# Patient Record
Sex: FEMALE | Race: BLACK OR AFRICAN AMERICAN | Employment: OTHER | RURAL
[De-identification: names, ages, dates, MRNs, and addresses within clinical notes are randomized per-mention and may not be internally consistent; named-entity substitution may affect disease eponyms.]

---

## 2017-03-17 ENCOUNTER — OFFICE VISIT (OUTPATIENT)
Dept: FAMILY MEDICINE CLINIC | Age: 62
End: 2017-03-17

## 2017-03-17 VITALS
BODY MASS INDEX: 37.39 KG/M2 | OXYGEN SATURATION: 99 % | RESPIRATION RATE: 16 BRPM | HEART RATE: 82 BPM | WEIGHT: 219 LBS | SYSTOLIC BLOOD PRESSURE: 133 MMHG | HEIGHT: 64 IN | TEMPERATURE: 97.6 F | DIASTOLIC BLOOD PRESSURE: 74 MMHG

## 2017-03-17 DIAGNOSIS — E78.00 PURE HYPERCHOLESTEROLEMIA: ICD-10-CM

## 2017-03-17 DIAGNOSIS — E11.9 TYPE 2 DIABETES MELLITUS WITHOUT COMPLICATION, WITHOUT LONG-TERM CURRENT USE OF INSULIN (HCC): Primary | ICD-10-CM

## 2017-03-17 DIAGNOSIS — I10 ESSENTIAL HYPERTENSION WITH GOAL BLOOD PRESSURE LESS THAN 130/85: ICD-10-CM

## 2017-03-17 NOTE — MR AVS SNAPSHOT
Visit Information Date & Time Provider Department Dept. Phone Encounter #  
 3/17/2017  1:40 PM Dino Libman, MD [facility] [id number] Follow-up Instructions Return in about 6 months (around 9/17/2017). Follow-up and Disposition History Your Appointments 5/10/2017  1:00 PM  
ESTABLISHED PATIENT with Dino Libman, MD  
[facility] ([facility]) Appt Note: 6 mo f/u  
 1000 [facility] [address] [facility],[address] [address] [phone]  
  
   
 [address] [facility] [address] Upcoming Health Maintenance Date Due Hepatitis C Screening 1955 EYE EXAM RETINAL OR DILATED Q1 5/29/1966 Pneumococcal 19-64 Highest Risk (1 of 3 - PCV13) 5/29/1975 DTaP/Tdap/Td series (1 - Tdap) 5/29/1977 PAP AKA CERVICAL CYTOLOGY 5/29/1977 BREAST CANCER SCRN MAMMOGRAM 5/29/2006 FOBT Q 1 YEAR AGE 50-75 5/29/2006 ZOSTER VACCINE AGE 60> 5/29/2016 MICROALBUMIN Q1 5/6/2017 LIPID PANEL Q1 5/6/2017 HEMOGLOBIN A1C Q6M 5/10/2017 FOOT EXAM Q1 6/8/2017 Allergies as of 3/17/2017  Review Complete On: 3/17/2017 By: Dino Libman, MD  
  
 Severity Noted Reaction Type Reactions Diovan [Valsartan] Low 05/06/2016   Side Effect Cough Current Immunizations  Reviewed on 11/10/2016 Name Date Influenza Vaccine 11/10/2016 Not reviewed this visit You Were Diagnosed With   
  
 Codes Comments Type 2 diabetes mellitus without complication, without long-term current use of insulin (HCC)    -  Primary ICD-10-CM: E11.9 ICD-9-CM: 250.00 Essential hypertension with goal blood pressure less than 130/85     ICD-10-CM: I10 
ICD-9-CM: 401.9 Pure hypercholesterolemia     ICD-10-CM: E78.00 ICD-9-CM: 272.0 Vitals BP Pulse Temp Resp Height(growth percentile) Weight(growth percentile)  133/74 (BP 1 Location: Right arm, BP Patient Position: Sitting) 82 97.6 °F (36.4 °C) (Oral) 16 5' 4\" (1.626 m) 219 lb (99.3 kg) SpO2 BMI OB Status Smoking Status 99% 37.59 kg/m2 Menopause Never Smoker BMI and BSA Data Body Mass Index Body Surface Area  
 37.59 kg/m 2 2.12 m 2 Preferred Pharmacy Pharmacy Name Phone [id number] [phone] Your Updated Medication List  
  
   
This list is accurate as of: 3/17/17  2:52 PM.  Always use your most recent med list.  
  
  
  
  
 aspirin delayed-release 81 mg tablet Take  by mouth daily. atorvastatin 40 mg tablet Commonly known as:  LIPITOR Take 1 Tab by mouth daily. BYDUREON 2 mg/0.65 mL Pnij Generic drug:  exenatide microspheres Inject subcutaneously 2mg  every 7 days  
  
 canagliflozin-metformin 150-1,000 mg Tbph  
Commonly known as:  INVOKAMET XR Take 1 Tab by mouth daily. docusate sodium 100 mg capsule Commonly known as:  Ethyl Hoit Take 100 mg by mouth two (2) times a day. esomeprazole 40 mg capsule Commonly known as:  Ira Meier Take  by mouth daily. furosemide 80 mg tablet Commonly known as:  LASIX Take 1 Tab by mouth daily as needed. Indications: fluid * levothyroxine 200 mcg tablet Commonly known as:  SYNTHROID Take  by mouth Daily (before breakfast). * levothyroxine 25 mcg tablet Commonly known as:  SYNTHROID Take  by mouth Daily (before breakfast). losartan 100 mg tablet Commonly known as:  COZAAR Take 100 mg by mouth daily. meloxicam 7.5 mg tablet Commonly known as:  MOBIC Take 1 Tab by mouth daily. ondansetron 8 mg disintegrating tablet Commonly known as:  ZOFRAN ODT Take 1 Tab by mouth three (3) times daily as needed for Nausea. * Notice: This list has 2 medication(s) that are the same as other medications prescribed for you.  Read the directions carefully, and ask your doctor or other care provider to review them with you. We Performed the Following COLLECTION VENOUS BLOOD,VENIPUNCTURE [id number] CPT(R)] HEMOGLOBIN A1C WITH EAG [96545 CPT(R)] LIPID PANEL [23131 CPT(R)] METABOLIC PANEL, COMPREHENSIVE [08235 CPT(R)] MICROALBUMIN, UR, RAND W/ MICROALBUMIN/CREA RATIO [id number] CPT(R)] Follow-up Instructions Return in about 6 months (around 9/17/2017). Patient Instructions If you have any questions regarding [employer], you may call [employer] support at [phone]. Introducing [facility] SERVICES! Dear Taina Sharma: Thank you for requesting a [employer] account. Our records indicate that you already have an active [employer] account. You can access your account anytime at [email or web address]. [email or web address] Did you know that you can access your hospital and ER discharge instructions at any time in [employer]? You can also review all of your test results from your hospital stay or ER visit. Additional Information If you have questions, please visit the Frequently Asked Questions section of the [employer] website at [email or web address] Remember, [employer] is NOT to be used for urgent needs. For medical emergencies, dial 911. Now available from your iPhone and Android! Please provide this summary of care documentation to your next provider. Your primary care clinician is listed as Tita Martinez. If you have any questions after today's visit, please call [phone].

## 2017-03-17 NOTE — PATIENT INSTRUCTIONS
If you have any questions regarding [employer], you may call [employer] support at [phone].

## 2017-03-17 NOTE — PROGRESS NOTES
Amanda Jasso is a 62 y.o. female presenting for/with:    Cough; Dizziness; and Nasal Congestion    HPI:  DM2  Doing a lot better since last visit. We consolidated to invokamet 1000/150 qd, but p has been on a cruise for past couple weeks, so wasn't taking it regularly, and missed a dose or tow of bydureon. Sugars running low 100's. Lab Results   Component Value Date/Time    Hemoglobin A1c 8.4 11/10/2016 02:12 PM     Lab Results   Component Value Date/Time    Sodium 143 11/10/2016 02:12 PM    Potassium 3.9 11/10/2016 02:12 PM    Chloride 105 11/10/2016 02:12 PM    CO2 25 11/10/2016 02:12 PM    Glucose 199 11/10/2016 02:12 PM    BUN 16 11/10/2016 02:12 PM    Creatinine 0.94 11/10/2016 02:12 PM    BUN/Creatinine ratio 17 11/10/2016 02:12 PM    GFR est AA 76 11/10/2016 02:12 PM    GFR est non-AA 66 11/10/2016 02:12 PM    Calcium 9.4 11/10/2016 02:12 PM       Lab Results   Component Value Date/Time    Microalb/Creat ratio (ug/mg creat.) 10.6 05/06/2016 03:13 PM    Microalbumin, urine 7.5 05/06/2016 03:13 PM     Last foot check good 6/2016  Last eye check ok with Dr. Jerrell Pappas Fall 2016. Hypertension. Blood pressures have been improving. Management at last visit included continuing current regimen . Current regimen: angiotensin II receptor antagonist and loop diuretic. Symptoms include polyuria with urgency. Patient denies chest pain, palpitations.   Lab review:   Lab Results   Component Value Date/Time    Sodium 143 11/10/2016 02:12 PM    Potassium 3.9 11/10/2016 02:12 PM    Chloride 105 11/10/2016 02:12 PM    CO2 25 11/10/2016 02:12 PM    Glucose 199 11/10/2016 02:12 PM    BUN 16 11/10/2016 02:12 PM    Creatinine 0.94 11/10/2016 02:12 PM    BUN/Creatinine ratio 17 11/10/2016 02:12 PM    GFR est AA 76 11/10/2016 02:12 PM    GFR est non-AA 66 11/10/2016 02:12 PM    Calcium 9.4 11/10/2016 02:12 PM     Hypothyroid  Lab Results   Component Value Date/Time    TSH 1.050 11/10/2016 02:12 PM   In goal on synthroid 225mcg/day. PMH, SH, Medications/Allergies: reviewed, on chart. ROS:  Constitutional: No fever, chills or weight loss  Respiratory: No cough, SOB   CV: No chest pain or Palpitations    Visit Vitals    /74 (BP 1 Location: Right arm, BP Patient Position: Sitting)    Pulse 82    Temp 97.6 °F (36.4 °C) (Oral)    Resp 16    Ht 5' 4\" (1.626 m)    Wt 219 lb (99.3 kg)    SpO2 99%    BMI 37.59 kg/m2     Wt Readings from Last 3 Encounters:   03/17/17 219 lb (99.3 kg)   11/10/16 215 lb (97.5 kg)   08/08/16 207 lb (93.9 kg)     BP Readings from Last 3 Encounters:   03/17/17 133/74   11/10/16 107/60   08/08/16 104/54     Physical Examination: General appearance - alert, well appearing, and in no distress  Mental status - alert, oriented to person, place, and time  Eyes - pupils equal and reactive, extraocular eye movements intact  ENT - bilateral external ears and nose normal. Normal lips  Neck - supple, no significant adenopathy, no thyromegaly or mass  Lymphatics - no palpable lymphadenopathy, no hepatosplenomegaly  Chest - clear to auscultation, no wheezes, rales or rhonchi, symmetric air entry  Heart - normal rate, regular rhythm, normal S1, S2, no murmurs, rubs, clicks or gallops  Extremities - peripheral pulses normal, no pedal edema, no clubbing or cyanosis    A/P:  DM2  Feeling good. Working on lose weight (desired) and sugars coming down nicely. Continue invokamet 1000/150 BID. Con't bydureon qwk. Recheck A1c, BMP. If not getting to goal, plan boost invokamet to 2x 150/1000's/day if gFR good. HTN and Hx Edema  W/C. Edema well controlled. Hypothyroid  In goal on 225mcg every day now. con't.   F/U 6mo/PRN

## 2017-03-18 LAB
ALBUMIN SERPL-MCNC: 3.9 G/DL (ref 3.6–4.8)
ALBUMIN/CREAT UR: 27.3 MG/G CREAT (ref 0–30)
ALBUMIN/GLOB SERPL: 1.6 {RATIO} (ref 1.2–2.2)
ALP SERPL-CCNC: 57 IU/L (ref 39–117)
ALT SERPL-CCNC: 10 IU/L (ref 0–32)
AST SERPL-CCNC: 21 IU/L (ref 0–40)
BILIRUB SERPL-MCNC: <0.2 MG/DL (ref 0–1.2)
BUN SERPL-MCNC: 12 MG/DL (ref 8–27)
BUN/CREAT SERPL: 13 (ref 11–26)
CALCIUM SERPL-MCNC: 8.7 MG/DL (ref 8.7–10.3)
CHLORIDE SERPL-SCNC: 104 MMOL/L (ref 96–106)
CHOLEST SERPL-MCNC: 128 MG/DL (ref 100–199)
CO2 SERPL-SCNC: 26 MMOL/L (ref 18–29)
CREAT SERPL-MCNC: 0.91 MG/DL (ref 0.57–1)
CREAT UR-MCNC: 155.2 MG/DL
EST. AVERAGE GLUCOSE BLD GHB EST-MCNC: 209 MG/DL
GLOBULIN SER CALC-MCNC: 2.5 G/DL (ref 1.5–4.5)
GLUCOSE SERPL-MCNC: 149 MG/DL (ref 65–99)
HBA1C MFR BLD: 8.9 % (ref 4.8–5.6)
HDLC SERPL-MCNC: 47 MG/DL
LDLC SERPL CALC-MCNC: 54 MG/DL (ref 0–99)
MICROALBUMIN UR-MCNC: 42.4 UG/ML
POTASSIUM SERPL-SCNC: 4.3 MMOL/L (ref 3.5–5.2)
PROT SERPL-MCNC: 6.4 G/DL (ref 6–8.5)
SODIUM SERPL-SCNC: 143 MMOL/L (ref 134–144)
TRIGL SERPL-MCNC: 134 MG/DL (ref 0–149)
VLDLC SERPL CALC-MCNC: 27 MG/DL (ref 5–40)

## 2017-05-31 ENCOUNTER — TELEPHONE (OUTPATIENT)
Dept: FAMILY MEDICINE CLINIC | Age: 62
End: 2017-05-31

## 2017-06-02 ENCOUNTER — TELEPHONE (OUTPATIENT)
Dept: FAMILY MEDICINE CLINIC | Age: 62
End: 2017-06-02

## 2017-06-02 NOTE — TELEPHONE ENCOUNTER
Patient just wanted to make sure that her reply to Dr Michelle Christie was received by Dr Michelle Christie through email.

## 2017-07-18 DIAGNOSIS — E11.9 TYPE 2 DIABETES MELLITUS WITHOUT COMPLICATION, WITHOUT LONG-TERM CURRENT USE OF INSULIN (HCC): ICD-10-CM

## 2017-07-18 NOTE — PROGRESS NOTES
Sugar up, too high. Salt, cholesterol, and kidney levels all ok. Recommend boosting invokamet XR to 2 pills daily. New order sent to pharmacy.

## 2017-09-15 ENCOUNTER — TELEPHONE (OUTPATIENT)
Dept: FAMILY MEDICINE CLINIC | Age: 62
End: 2017-09-15

## 2017-09-15 DIAGNOSIS — E03.9 ACQUIRED HYPOTHYROIDISM: Primary | ICD-10-CM

## 2017-09-15 RX ORDER — LEVOTHYROXINE SODIUM 25 UG/1
25 TABLET ORAL
Qty: 90 TAB | Refills: 3 | Status: SHIPPED | OUTPATIENT
Start: 2017-09-15 | End: 2018-05-24 | Stop reason: DRUGHIGH

## 2017-09-15 RX ORDER — LEVOTHYROXINE SODIUM 200 UG/1
200 TABLET ORAL
Qty: 90 TAB | Refills: 3 | Status: SHIPPED | OUTPATIENT
Start: 2017-09-15 | End: 2018-08-10 | Stop reason: SDUPTHER

## 2017-09-15 NOTE — TELEPHONE ENCOUNTER
[phone] contact # radha Samuel, please have Dr. Monroe Nichols contact me. Patient states that she is very upset as she attempted to get refill from pharmacy and got no response from this office of approval or denial patient did not give name or dosage of medication as she hung up/lost connection before giving me any of this information (see attached paste below)     [facility] contacted Kaya FLORENCIO Rosales - message pasted from refill note on 08/10/2017 with no medication name or dosage    Lizzie states she reached out to Dr. Monroe Nichols on [place] and still no response, assured patient that  has been extremely busy and does check messages and will respond to patients request.  Patient would like for Dr. Monroe Nichols to contact her/not the nurse.   Cc: Andrew Desouza   Last visit  Friday, March 17, 2017 01:40 PM  Next visit   Wednesday, September 20, 2017 01:00 PM     Thanks,

## 2017-09-15 NOTE — TELEPHONE ENCOUNTER
Contacted pt. She noted she asked her pharmacy to send the ordering provider a refill request for synthroid, but didn't check to see if it was ordered by us or by her provider in [place], so the refill request may not have come to us. I'll order it, her last TSH was in goal. Also will check on handicap tag, pt hasn't gotten that yet.

## 2017-12-04 ENCOUNTER — TELEPHONE (OUTPATIENT)
Dept: FAMILY MEDICINE CLINIC | Age: 62
End: 2017-12-04

## 2017-12-04 NOTE — TELEPHONE ENCOUNTER
Spoke with sister, states she blew her nose and it was bleeding on one side, she put pressure on it and it has stopped. Advised what to do if starts bleeding again.

## 2018-02-26 ENCOUNTER — OFFICE VISIT (OUTPATIENT)
Dept: FAMILY MEDICINE CLINIC | Age: 63
End: 2018-02-26

## 2018-02-26 VITALS
TEMPERATURE: 97.9 F | RESPIRATION RATE: 14 BRPM | SYSTOLIC BLOOD PRESSURE: 110 MMHG | DIASTOLIC BLOOD PRESSURE: 62 MMHG | OXYGEN SATURATION: 100 % | HEART RATE: 89 BPM | WEIGHT: 217 LBS | BODY MASS INDEX: 37.05 KG/M2 | HEIGHT: 64 IN

## 2018-02-26 DIAGNOSIS — E78.00 PURE HYPERCHOLESTEROLEMIA: ICD-10-CM

## 2018-02-26 DIAGNOSIS — Z12.11 SCREEN FOR COLON CANCER: ICD-10-CM

## 2018-02-26 DIAGNOSIS — Z00.00 MEDICARE ANNUAL WELLNESS VISIT, SUBSEQUENT: Primary | ICD-10-CM

## 2018-02-26 DIAGNOSIS — Z13.39 SCREENING FOR ALCOHOLISM: ICD-10-CM

## 2018-02-26 DIAGNOSIS — Z13.31 SCREENING FOR DEPRESSION: ICD-10-CM

## 2018-02-26 DIAGNOSIS — I10 ESSENTIAL HYPERTENSION WITH GOAL BLOOD PRESSURE LESS THAN 130/85: ICD-10-CM

## 2018-02-26 DIAGNOSIS — Z23 ENCOUNTER FOR IMMUNIZATION: ICD-10-CM

## 2018-02-26 DIAGNOSIS — E03.9 ACQUIRED HYPOTHYROIDISM: ICD-10-CM

## 2018-02-26 DIAGNOSIS — Z11.59 NEED FOR HEPATITIS C SCREENING TEST: ICD-10-CM

## 2018-02-26 DIAGNOSIS — E11.9 TYPE 2 DIABETES MELLITUS WITHOUT COMPLICATION, WITHOUT LONG-TERM CURRENT USE OF INSULIN (HCC): ICD-10-CM

## 2018-02-26 RX ORDER — ONDANSETRON 8 MG/1
8 TABLET, ORALLY DISINTEGRATING ORAL
Qty: 20 TAB | Refills: 1 | Status: SHIPPED | OUTPATIENT
Start: 2018-02-26

## 2018-02-26 RX ORDER — FUROSEMIDE 40 MG/1
40 TABLET ORAL DAILY
Qty: 90 TAB | Refills: 3 | Status: SHIPPED | OUTPATIENT
Start: 2018-02-26 | End: 2019-01-25 | Stop reason: SDUPTHER

## 2018-02-26 RX ORDER — RANITIDINE 150 MG/1
150 TABLET, FILM COATED ORAL 2 TIMES DAILY
COMMUNITY
End: 2020-09-10 | Stop reason: RX

## 2018-02-26 RX ORDER — ATORVASTATIN CALCIUM 40 MG/1
40 TABLET, FILM COATED ORAL DAILY
Qty: 90 TAB | Refills: 3 | Status: SHIPPED | OUTPATIENT
Start: 2018-02-26 | End: 2019-02-14 | Stop reason: SDUPTHER

## 2018-02-26 RX ORDER — POTASSIUM CHLORIDE 750 MG/1
10 TABLET, EXTENDED RELEASE ORAL DAILY
Qty: 90 TAB | Refills: 3 | Status: SHIPPED | OUTPATIENT
Start: 2018-02-26 | End: 2019-01-27 | Stop reason: SDUPTHER

## 2018-02-26 NOTE — PROGRESS NOTES
Markos Verduzco is a 65 y.o. female presenting for/with: Annual Wellness Visit; Advance Care Planning; and Diabetes    HPI:  DM2  Doing well since last visit. We boosted invokamet XR to 2x 150/1000mg pills daily. Taking them regularly now. Still taking bydureon once a week. Sugars running low 100's. Lab Results   Component Value Date/Time    Hemoglobin A1c 8.9 (H) 03/17/2017 02:16 PM    Hemoglobin A1c 8.4 (H) 11/10/2016 02:12 PM    Hemoglobin A1c 8.5 (H) 05/06/2016 03:11 PM    Glucose 149 (H) 03/17/2017 02:16 PM    Microalb/Creat ratio (ug/mg creat.) 27.3 03/17/2017 02:16 PM    LDL, calculated 54 03/17/2017 02:16 PM    Creatinine 0.91 03/17/2017 02:16 PM     Last foot check good 6/2016  Last eye check ok with Dr. Quentin Armenta Fall 2016. Hypertension. Blood pressures have been improving. Management at last visit included continuing current regimen . Current regimen: angiotensin II receptor antagonist and loop diuretic. Symptoms include polyuria with urgency. Patient denies chest pain, palpitations. Lab review:   Lab Results   Component Value Date/Time    Sodium 143 03/17/2017 02:16 PM    Potassium 4.3 03/17/2017 02:16 PM    Chloride 104 03/17/2017 02:16 PM    CO2 26 03/17/2017 02:16 PM    Glucose 149 (H) 03/17/2017 02:16 PM    BUN 12 03/17/2017 02:16 PM    Creatinine 0.91 03/17/2017 02:16 PM    BUN/Creatinine ratio 13 03/17/2017 02:16 PM    GFR est AA 79 03/17/2017 02:16 PM    GFR est non-AA 69 03/17/2017 02:16 PM    Calcium 8.7 03/17/2017 02:16 PM     Hypothyroid  Lab Results   Component Value Date/Time    TSH 1.050 11/10/2016 02:12 PM   In goal on synthroid 225mcg/day. PMH, SH, Medications/Allergies: reviewed, on chart.     ROS:  Constitutional: No fever, chills or weight loss  Respiratory: No cough, SOB   CV: No chest pain or Palpitations    Visit Vitals    /62 (BP 1 Location: Right arm, BP Patient Position: Sitting)    Pulse 89    Temp 97.9 °F (36.6 °C) (Oral)    Resp 14    Ht 5' 4\" (1.626 m)  Wt 217 lb (98.4 kg)    SpO2 100%    BMI 37.25 kg/m2     Wt Readings from Last 3 Encounters:   02/26/18 217 lb (98.4 kg)   03/17/17 219 lb (99.3 kg)   11/10/16 215 lb (97.5 kg)   -2#  BP Readings from Last 3 Encounters:   02/26/18 110/62   03/17/17 133/74   11/10/16 107/60     Physical Examination: General appearance - alert, well appearing, and in no distress  Mental status - alert, oriented to person, place, and time  Eyes - pupils equal and reactive, extraocular eye movements intact  ENT - bilateral external ears and nose normal. Normal lips  Neck - supple, no significant adenopathy, no thyromegaly or mass. Incidentally noted polypoid skin papules   Lymphatics - no palpable lymphadenopathy, no hepatosplenomegaly  Chest - clear to auscultation, no wheezes, rales or rhonchi, symmetric air entry  Heart - normal rate, regular rhythm, normal S1, S2, no murmurs, rubs, clicks or gallops  Extremities - peripheral pulses normal, no pedal edema, no clubbing or cyanosis. Foot check: No calluses, no tinea. DP, PT pulses 2+ bilat. Monofilament test normal bilat. A/P:  DM2  Feeling good. Working on lose weight (desired) and sugars coming down nicely. Continue invokamet 1000/150 1 BID. Con't bydureon qwk. Recheck A1c, CMP. HTN and Hx Edema  W/C. Edema well controlled, ok to cut lasix to 40mg every day. Hypothyroid  In goal on 225mcg every day now. Con't. Check TSH.    ______________________________________________________________________    Pierre Hernandez is a 65 y.o. female and presents for annual Medicare Wellness Visit. Problem List: Reviewed with patient and discussed risk factors.     Patient Active Problem List   Diagnosis Code    History of colonoscopy Z98.890    Colon polyp K63.5    Essential hypertension with goal blood pressure less than 130/85 I10    Type 2 diabetes mellitus without complication (HCC) O43.1    Pure hypercholesterolemia E78.00    Primary osteoarthritis of left shoulder M19.012    Gastroesophageal reflux disease without esophagitis K21.9    Malignant neoplasm of central portion of left female breast ([facility] [address] C50.112    Acquired hypothyroidism E03.9       Current medical providers:  Patient Care Team:  Mariusz Chauhan MD as PCP - General (Family Practice)    PM, , Medications/Allergies: reviewed, on chart. Female Alcohol Screening: On any occasion during the past 3 months, have you had more than 3 drinks containing alcohol? No    Do you average more than 7 drinks per week? No    ROS:  Constitutional: No fever, chills or weight loss  Respiratory: No cough, SOB   CV: No chest pain or Palpitations    Objective:  Visit Vitals    /62 (BP 1 Location: Right arm, BP Patient Position: Sitting)    Pulse 89    Temp 97.9 °F (36.6 °C) (Oral)    Resp 14    Ht 5' 4\" (1.626 m)    Wt 217 lb (98.4 kg)    SpO2 100%    BMI 37.25 kg/m2    Body mass index is 37.25 kg/(m^2). Assessment of cognitive impairment: Alert and oriented x 3    Depression Screen:   PHQ over the last two weeks 2/26/2018   PHQ Not Done -   Little interest or pleasure in doing things Not at all   Feeling down, depressed or hopeless Not at all   Total Score PHQ 2 0       Fall Risk Assessment:  No flowsheet data found. Functional Ability:   Does the patient exhibit a steady gait? yes   How long did it take the patient to get up and walk from a sitting position? 1s   Is the patient self reliant?  (ie can do own laundry, meals, household chores)  yes     Does the patient handle his/her own medications? yes     Does the patient handle his/her own money? yes     Is the patients home safe (ie good lighting, handrails on stairs and bath, etc.)? yes     Did you notice or did patient express any hearing difficulties? no     Did you notice or did patient express any vision difficulties?    no       Advance Care Planning:   Patient was offered the opportunity to discuss advance care planning:  yes     Does patient have an Advance Directive:  no   If no, did you provide information on Caring Connections? yes     Plan:      Orders Placed This Encounter    Depression Screen Annual    Influenza virus vaccine (QUADRIVALENT PF SYRINGE) (88228)    Pneumococcal Conjugate Vaccine    HEMOGLOBIN A1C WITH EAG    TSH RFX ON ABNORMAL TO FREE T4    METABOLIC PANEL, COMPREHENSIVE    LIPID PANEL    OCCULT BLOOD, IMMUNOASSAY (FIT) (54430)    HCV AB W/RFLX TO [facility] DIABETES FOOT EXAM    Influenza Admin ()    Annual  Alcohol Screen 15 min ()    Pneumococcal Admin ()    raNITIdine (ZANTAC) 150 mg tablet    furosemide (LASIX) 40 mg tablet    potassium chloride (KLOR-CON) 10 mEq tablet    ondansetron (ZOFRAN ODT) 8 mg disintegrating tablet    atorvastatin (LIPITOR) 40 mg tablet       Health Maintenance   Topic Date Due    Hepatitis C Screening  1955    Pneumococcal 19-64 Highest Risk (1 of 3 - PCV13) 05/29/1975    PAP AKA CERVICAL CYTOLOGY  05/29/1977    BREAST CANCER SCRN MAMMOGRAM  05/29/2006    FOBT Q 1 YEAR AGE 50-75  05/29/2006    ZOSTER VACCINE AGE 60>  03/29/2016    FOOT EXAM Q1  06/08/2017    Influenza Age 5 to Adult  08/01/2017    HEMOGLOBIN A1C Q6M  09/17/2017    MEDICARE YEARLY EXAM  11/11/2017    MICROALBUMIN Q1  03/17/2018    LIPID PANEL Q1  03/17/2018    EYE EXAM RETINAL OR DILATED Q1  08/17/2018    DTaP/Tdap/Td series (2 - Td) 02/26/2028       *Patient verbalized understanding and agreement with the plan. A copy of the After Visit Summary with personalized health plan was given to the patient today.     F/U 6mo/PRN

## 2018-02-26 NOTE — MR AVS SNAPSHOT
Ashlee Andres 
 
 
 [address] [facility] [phone] Patient: Gilberto Cochran MRN: [id number] MARTHA:[birth year] Visit Information Date & Time Provider Department Dept. Phone Encounter #  
 2/26/2018  1:20 PM Parviz Victoria  Mitra Delatorre [id number] Follow-up Instructions Return in about 3 months (around 5/26/2018). Upcoming Health Maintenance Date Due Hepatitis C Screening 1955 Pneumococcal 19-64 Highest Risk (1 of 3 - PCV13) 5/29/1975 PAP AKA CERVICAL CYTOLOGY 5/29/1977 BREAST CANCER SCRN MAMMOGRAM 5/29/2006 FOBT Q 1 YEAR AGE 50-75 5/29/2006 ZOSTER VACCINE AGE 60> 3/29/2016 FOOT EXAM Q1 6/8/2017 Influenza Age 5 to Adult 8/1/2017 HEMOGLOBIN A1C Q6M 9/17/2017 MEDICARE YEARLY EXAM 11/11/2017 MICROALBUMIN Q1 3/17/2018 LIPID PANEL Q1 3/17/2018 EYE EXAM RETINAL OR DILATED Q1 8/17/2018 DTaP/Tdap/Td series (2 - Td) 2/26/2028 Allergies as of 2/26/2018  Review Complete On: 2/26/2018 By: Parviz Victoria MD  
  
 Severity Noted Reaction Type Reactions Diovan [Valsartan] Low 05/06/2016   Side Effect Cough Current Immunizations  Reviewed on 11/10/2016 Name Date Influenza Vaccine 2/26/2018, 11/10/2016 Influenza Vaccine (Quad) PF 2/26/2018 Pneumococcal Conjugate (PCV-13)  Incomplete Not reviewed this visit You Were Diagnosed With   
  
 Codes Comments Medicare annual wellness visit, subsequent    -  Primary ICD-10-CM: Z00.00 ICD-9-CM: V70.0 Encounter for immunization     ICD-10-CM: A86 ICD-9-CM: V03.89 Type 2 diabetes mellitus without complication, without long-term current use of insulin (HCC)     ICD-10-CM: E11.9 ICD-9-CM: 250.00 Pure hypercholesterolemia     ICD-10-CM: E78.00 ICD-9-CM: 272.0 Essential hypertension with goal blood pressure less than 130/85     ICD-10-CM: I10 
ICD-9-CM: 401.9 Acquired hypothyroidism     ICD-10-CM: E03.9 ICD-9-CM: 244.9 Screening for alcoholism     ICD-10-CM: Z13.89 ICD-9-CM: V79.1 Screening for depression     ICD-10-CM: Z13.89 ICD-9-CM: V79.0 Screen for colon cancer     ICD-10-CM: Z12.11 ICD-9-CM: V76.51 Need for hepatitis C screening test     ICD-10-CM: Z11.59 
ICD-9-CM: V73.89 Vitals BP Pulse Temp Resp Height(growth percentile) Weight(growth percentile) 110/62 (BP 1 Location: Right arm, BP Patient Position: Sitting) 89 97.9 °F (36.6 °C) (Oral) 14 5' 4\" (1.626 m) 217 lb (98.4 kg) SpO2 BMI OB Status Smoking Status 100% 37.25 kg/m2 Menopause Never Smoker BMI and BSA Data Body Mass Index Body Surface Area  
 37.25 kg/m 2 2.11 m 2 Preferred Pharmacy Pharmacy Name Phone [address] [place] [address] [address] [place] [address] Your Updated Medication List  
  
   
This list is accurate as of 2/26/18  2:33 PM.  Always use your most recent med list.  
  
  
  
  
 aspirin delayed-release 81 mg tablet Take  by mouth daily. atorvastatin 40 mg tablet Commonly known as:  LIPITOR Take 1 Tab by mouth daily. Indications: cholesterol and heart BYDUREON 2 mg/0.65 mL Pnij Generic drug:  exenatide microspheres INJECT SUBCUTANEOUSLY 2MG  EVERY 7 DAYS  
  
 canagliflozin-metformin 150-1,000 mg Tbph  
Commonly known as:  INVOKAMET XR Take 2 Tabs by mouth daily. Indications: sugar, new higher dose  
  
 furosemide 40 mg tablet Commonly known as:  LASIX Take 1 Tab by mouth daily. Indications: fluid and heart * levothyroxine 25 mcg tablet Commonly known as:  SYNTHROID Take 1 Tab by mouth Daily (before breakfast). Indications: low thyroid, take with the 200mcg * levothyroxine 200 mcg tablet Commonly known as:  SYNTHROID Take 1 Tab by mouth Daily (before breakfast). Indications: low thyroid, take with the 25mcg  
  
 losartan 100 mg tablet Commonly known as:  COZAAR Take 100 mg by mouth daily. meloxicam 7.5 mg tablet Commonly known as:  MOBIC  
TAKE 1 TABLET BY MOUTH  DAILY  
  
 ondansetron 8 mg disintegrating tablet Commonly known as:  ZOFRAN ODT Take 1 Tab by mouth three (3) times daily as needed for Nausea. potassium chloride 10 mEq tablet Commonly known as:  KLOR-CON Take 1 Tab by mouth daily. Indications: mineral  
  
 ZANTAC 150 mg tablet Generic drug:  raNITIdine Take 150 mg by mouth two (2) times a day. * Notice: This list has 2 medication(s) that are the same as other medications prescribed for you. Read the directions carefully, and ask your doctor or other care provider to review them with you. Prescriptions Sent to Pharmacy Refills  
 furosemide (LASIX) 40 mg tablet 3 Sig: Take 1 Tab by mouth daily. Indications: fluid and heart Class: Normal  
 Pharmacy: [address] [place] [address], . [address] Ph #: [phone] Route: Oral  
 potassium chloride (KLOR-CON) 10 mEq tablet 3 Sig: Take 1 Tab by mouth daily. Indications: mineral  
 Class: Normal  
 Pharmacy: [address] [place] [address], . [address] Ph #: [phone] Route: Oral  
 ondansetron (ZOFRAN ODT) 8 mg disintegrating tablet 1 Sig: Take 1 Tab by mouth three (3) times daily as needed for Nausea. Class: Normal  
 Pharmacy: [address] [place] [address], . [address] Ph #: [phone] Route: Oral  
 atorvastatin (LIPITOR) 40 mg tablet 3 Sig: Take 1 Tab by mouth daily. Indications: cholesterol and heart Class: Normal  
 Pharmacy: [address] [place] [address], . [address] Ph #: [phone] Route: Oral  
  
We Performed the Following ADMIN INFLUENZA VIRUS VAC [ [facility]] ADMIN PNEUMOCOCCAL VACCINE [ [facility]] [facility] 68 [[id number] [facility]] HCV AB W/RFLX TO KAILEE [51313 CPT(R)] HEMOGLOBIN A1C WITH EAG [29056 CPT(R)]  DIABETES FOOT EXAM [HM7 Custom] INFLUENZA VIRUS VAC QUAD,SPLIT,PRESV FREE SYRINGE IM [id number] CPT(R)] LIPID PANEL [05498 CPT(R)] METABOLIC PANEL, COMPREHENSIVE [52310 CPT(R)] PNEUMOCOCCAL CONJ VACCINE 13 VALENT IM [id number] CPT(R)] WV ANNUAL ALCOHOL SCREEN 15 MIN [id number] [facility]] TSH RFX ON ABNORMAL TO FREE T4 [[id number] Custom] Follow-up Instructions Return in about 3 months (around 5/26/2018). To-Do List   
 03/28/2018 Lab:  OCCULT BLOOD, IMMUNOASSAY (FIT) Patient Instructions Medicare Wellness Visit, Female The best way to live healthy is to have a healthy lifestyle by eating a well-balanced diet, exercising regularly, limiting alcohol and stopping smoking. Regular physical exams and screening tests are another way to keep healthy. Preventive exams provided by your health care provider can find health problems before they become diseases or illnesses. Preventive services including immunizations, screening tests, monitoring and exams can help you take care of your own health. All people over age 72 should have a pneumovax  and and a prevnar shot to prevent pneumonia. These are once in a lifetime unless you and your provider decide differently. All people over 65 should have a yearly flu shot and a tetanus vaccine every 10 years. A bone mass density to screen for osteoporosis or thinning of the bones should be done every 2 years after 65. Screening for diabetes mellitus with a blood sugar test should be done every year. Glaucoma is a disease of the eye due to increased ocular pressure that can lead to blindness and it should be done every year by an eye professional. 
 
Cardiovascular screening tests that check for elevated lipids (fatty part of blood) which can lead to heart disease and strokes should be done every 5 years.  
 
Colorectal screening that evaluates for blood or polyps in your colon should be done yearly as a stool test or every five years as a flexible sigmoidoscope or every 10 years as a colonoscopy up to age 76. Breast cancer screening with a mammogram is recommended biennially  for women age 54-69. Screening for cervical cancer with a pap smear and pelvic exam is recommended for women after age 72 years every 2 years up to age 79 or when the provider and patient decide to stop. If there is a history of cervical abnormalities or other increased risk for cancer then the test is recommended yearly. Hepatitis C screening is also recommended for anyone born between 80 through [address]. A shingles vaccine is also recommended once in a lifetime after age 61. Your Medicare Wellness Exam is recommended annually. Here is a list of your current Health Maintenance items with a due date: 
Health Maintenance Due Topic Date Due  
 Hepatitis C Test  1955  Pneumococcal Vaccine (1 of 3 - PCV13) 05/29/1975  Cervical Cancer Screening  05/29/1977  Breast Cancer Screening  05/29/2006  Stool testing for trace blood  05/29/2006  Shingles Vaccine  03/29/2016  Diabetic Foot Care  06/08/2017  Flu Vaccine  08/01/2017  Hemoglobin A1C    09/17/2017 Himanshu Griffin Annual Well Visit  11/11/2017  Albumin Urine Test  03/17/2018  Cholesterol Test   03/17/2018 Learning About Bariatric Surgery What is bariatric surgery? Bariatric surgery is surgery to help you lose weight. This type of surgery is only used for people who are very overweight and have not been able to lose weight with diet and exercise. This surgery makes the stomach smaller. Some types of surgery also change the connection between your stomach and intestines. How is bariatric surgery done? Bariatric surgery may be either \"open\" or \"laparoscopic. \" Open surgery is done through a large cut (incision) in the belly. Laparoscopic surgery is done through several small cuts.  The doctor puts a lighted tube, or scope, and other surgical tools through small cuts in your belly. The doctor is able to see your organs with the scope. There are different types of bariatric surgery. Gastric sleeve surgery The surgery is usually done through several small incisions in the belly. The doctor removes more than half of your stomach. This leaves a thin sleeve, or tube, that is about the size of a banana. Because part of your stomach has been removed, this can't be reversed. Robert-en-Y gastric bypass surgery Robert-en-Y (say \"melinda-en-why\") surgery changes the connection between the stomach and the intestines. The doctor separates a section of your stomach from the rest of your stomach. This makes a small pouch. The new pouch will hold the food you eat. The doctor connects the stomach pouch to the middle part of the small intestine. Gastric banding surgery The surgery is usually done through several small incisions in the belly. The doctor wraps a band around the upper part of the stomach. This creates a small pouch. The small size of the pouch means that you will get full after you eat just a small amount of food. The doctor can inflate or deflate the band to adjust the size. This lets the doctor adjust how quickly food passes from the new pouch into the stomach. It does not change the connection between the stomach and the intestines. What can you expect after the surgery? You may stay in the hospital for one or more days after the surgery. How long you stay depends on the type of surgery you had. Most people need 2 to 4 weeks before they are ready to get back to their usual routine. For the first 2 to 6 weeks after surgery, you probably will need to follow a liquid or soft diet. Bit by bit, you will be able to eat more solid foods. Your doctor may advise you to work with a dietitian.  This way you'll be sure to get enough protein, vitamins, and minerals while you are losing weight. Even with a healthy diet, you may need to take vitamin and mineral supplements. After surgery, you will not be able to eat very much at one time. You will get full quickly. Try not to eat too much at one time or eat foods that are high in fat or sugar. If you do, you may vomit, get stomach pain, or have diarrhea. You probably will lose weight very quickly in the first few months after surgery. As time goes on, your weight loss will slow down. You will have regular doctor visits to check how you are doing. Think of bariatric surgery as a tool to help you lose weight. It isn't an instant fix. You will still need to eat a healthy diet and get regular exercise. This will help you reach your weight goal and avoid regaining the weight you lose. Follow-up care is a key part of your treatment and safety. Be sure to make and go to all appointments, and call your doctor if you are having problems. It's also a good idea to know your test results and keep a list of the medicines you take. Where can you learn more? Go to http://joe-melva.info/. Enter G469 in the search box to learn more about \"Learning About Bariatric Surgery. \" Current as of: October 13, 2016 Content Version: 11.4 © 7381-2159 Healthwise, Incorporated. Care instructions adapted under license by [employer] (which disclaims liability or warranty for this information). If you have questions about a medical condition or this instruction, always ask your healthcare professional. [place] [address] any warranty or liability for your use of this information. Introducing [facility]! Dear Lionel Graham: Thank you for requesting a BandPage account. Our records indicate that you already have an active BandPage account. You can access your account anytime at [email or web address] Did you know that you can access your hospital and ER discharge instructions at any time in SuperSport? You can also review all of your test results from your hospital stay or ER visit. Additional Information If you have questions, please visit the Frequently Asked Questions section of the SuperSport website at [email or web address] Remember, SuperSport is NOT to be used for urgent needs. For medical emergencies, dial 911. Now available from your iPhone and Android! Please provide this summary of care documentation to your next provider. Your primary care clinician is listed as Angélica Martinez. If you have any questions after today's visit, please call [phone].

## 2018-02-26 NOTE — PATIENT INSTRUCTIONS
Medicare Wellness Visit, Female    The best way to live healthy is to have a healthy lifestyle by eating a well-balanced diet, exercising regularly, limiting alcohol and stopping smoking. Regular physical exams and screening tests are another way to keep healthy. Preventive exams provided by your health care provider can find health problems before they become diseases or illnesses. Preventive services including immunizations, screening tests, monitoring and exams can help you take care of your own health. All people over age 72 should have a pneumovax  and and a prevnar shot to prevent pneumonia. These are once in a lifetime unless you and your provider decide differently. All people over 65 should have a yearly flu shot and a tetanus vaccine every 10 years. A bone mass density to screen for osteoporosis or thinning of the bones should be done every 2 years after 65. Screening for diabetes mellitus with a blood sugar test should be done every year. Glaucoma is a disease of the eye due to increased ocular pressure that can lead to blindness and it should be done every year by an eye professional.    Cardiovascular screening tests that check for elevated lipids (fatty part of blood) which can lead to heart disease and strokes should be done every 5 years. Colorectal screening that evaluates for blood or polyps in your colon should be done yearly as a stool test or every five years as a flexible sigmoidoscope or every 10 years as a colonoscopy up to age 76. Breast cancer screening with a mammogram is recommended biennially  for women age 54-69. Screening for cervical cancer with a pap smear and pelvic exam is recommended for women after age 72 years every 2 years up to age 79 or when the provider and patient decide to stop. If there is a history of cervical abnormalities or other increased risk for cancer then the test is recommended yearly.     Hepatitis C screening is also recommended for anyone born between [birth year] [facility]. A shingles vaccine is also recommended once in a lifetime after age 61. Your Medicare Wellness Exam is recommended annually. Here is a list of your current Health Maintenance items with a due date:  Health Maintenance Due   Topic Date Due    Hepatitis C Test  1955    Pneumococcal Vaccine (1 of 3 - PCV13) 05/29/1975    Cervical Cancer Screening  05/29/1977    Breast Cancer Screening  05/29/2006    Stool testing for trace blood  05/29/2006    Shingles Vaccine  03/29/2016    Diabetic Foot Care  06/08/2017    Flu Vaccine  08/01/2017    Hemoglobin A1C    09/17/2017    Annual Well Visit  11/11/2017    Albumin Urine Test  03/17/2018    Cholesterol Test   03/17/2018     Learning About Bariatric Surgery  What is bariatric surgery? Bariatric surgery is surgery to help you lose weight. This type of surgery is only used for people who are very overweight and have not been able to lose weight with diet and exercise. This surgery makes the stomach smaller. Some types of surgery also change the connection between your stomach and intestines. How is bariatric surgery done? Bariatric surgery may be either \"open\" or \"laparoscopic. \" Open surgery is done through a large cut (incision) in the belly. Laparoscopic surgery is done through several small cuts. The doctor puts a lighted tube, or scope, and other surgical tools through small cuts in your belly. The doctor is able to see your organs with the scope. There are different types of bariatric surgery. Gastric sleeve surgery  The surgery is usually done through several small incisions in the belly. The doctor removes more than half of your stomach. This leaves a thin sleeve, or tube, that is about the size of a banana. Because part of your stomach has been removed, this can't be reversed.   Robert-en-Y gastric bypass surgery  Robert-en-Y (say \"melinda-en-why\") surgery changes the connection between the stomach and the intestines. The doctor separates a section of your stomach from the rest of your stomach. This makes a small pouch. The new pouch will hold the food you eat. The doctor connects the stomach pouch to the middle part of the small intestine. Gastric banding surgery  The surgery is usually done through several small incisions in the belly. The doctor wraps a band around the upper part of the stomach. This creates a small pouch. The small size of the pouch means that you will get full after you eat just a small amount of food. The doctor can inflate or deflate the band to adjust the size. This lets the doctor adjust how quickly food passes from the new pouch into the stomach. It does not change the connection between the stomach and the intestines. What can you expect after the surgery? You may stay in the hospital for one or more days after the surgery. How long you stay depends on the type of surgery you had. Most people need 2 to 4 weeks before they are ready to get back to their usual routine. For the first 2 to 6 weeks after surgery, you probably will need to follow a liquid or soft diet. Bit by bit, you will be able to eat more solid foods. Your doctor may advise you to work with a dietitian. This way you'll be sure to get enough protein, vitamins, and minerals while you are losing weight. Even with a healthy diet, you may need to take vitamin and mineral supplements. After surgery, you will not be able to eat very much at one time. You will get full quickly. Try not to eat too much at one time or eat foods that are high in fat or sugar. If you do, you may vomit, get stomach pain, or have diarrhea. You probably will lose weight very quickly in the first few months after surgery. As time goes on, your weight loss will slow down. You will have regular doctor visits to check how you are doing. Think of bariatric surgery as a tool to help you lose weight. It isn't an instant fix.  You will still need to eat a healthy diet and get regular exercise. This will help you reach your weight goal and avoid regaining the weight you lose. Follow-up care is a key part of your treatment and safety. Be sure to make and go to all appointments, and call your doctor if you are having problems. It's also a good idea to know your test results and keep a list of the medicines you take. Where can you learn more? Go to http://joe-melva.info/. Enter G469 in the search box to learn more about \"Learning About Bariatric Surgery. \"  Current as of: October 13, 2016  Content Version: 11.4  © 2415-6237 Healthwise, ABS. Care instructions adapted under license by [employer] (which disclaims liability or warranty for this information). If you have questions about a medical condition or this instruction, always ask your healthcare professional. [address] any warranty or liability for your use of this information.

## 2018-02-26 NOTE — ACP (ADVANCE CARE PLANNING)
Pt has advance directive at home. Recommended to bring by the clinic for inclusion in chart at patient's convenience.  Discussed 2/26/2018

## 2018-02-27 LAB
ALBUMIN SERPL-MCNC: 4.6 G/DL (ref 3.6–4.8)
ALBUMIN/GLOB SERPL: 1.5 {RATIO} (ref 1.2–2.2)
ALP SERPL-CCNC: 76 IU/L (ref 39–117)
ALT SERPL-CCNC: 12 IU/L (ref 0–32)
AST SERPL-CCNC: 9 IU/L (ref 0–40)
BILIRUB SERPL-MCNC: 0.3 MG/DL (ref 0–1.2)
BUN SERPL-MCNC: 18 MG/DL (ref 8–27)
BUN/CREAT SERPL: 19 (ref 12–28)
CALCIUM SERPL-MCNC: 9.7 MG/DL (ref 8.7–10.3)
CHLORIDE SERPL-SCNC: 95 MMOL/L (ref 96–106)
CHOLEST SERPL-MCNC: 168 MG/DL (ref 100–199)
CO2 SERPL-SCNC: 25 MMOL/L (ref 18–29)
CREAT SERPL-MCNC: 0.95 MG/DL (ref 0.57–1)
EST. AVERAGE GLUCOSE BLD GHB EST-MCNC: 237 MG/DL
GFR SERPLBLD CREATININE-BSD FMLA CKD-EPI: 65 ML/MIN/1.73
GFR SERPLBLD CREATININE-BSD FMLA CKD-EPI: 75 ML/MIN/1.73
GLOBULIN SER CALC-MCNC: 3.1 G/DL (ref 1.5–4.5)
GLUCOSE SERPL-MCNC: 185 MG/DL (ref 65–99)
HBA1C MFR BLD: 9.9 % (ref 4.8–5.6)
HCV AB S/CO SERPL IA: <0.1 S/CO RATIO (ref 0–0.9)
HCV AB SERPL QL IA: NORMAL
HDLC SERPL-MCNC: 58 MG/DL
LDLC SERPL CALC-MCNC: 78 MG/DL (ref 0–99)
POTASSIUM SERPL-SCNC: 4 MMOL/L (ref 3.5–5.2)
PROT SERPL-MCNC: 7.7 G/DL (ref 6–8.5)
SODIUM SERPL-SCNC: 140 MMOL/L (ref 134–144)
T4 FREE SERPL-MCNC: 1.46 NG/DL (ref 0.82–1.77)
TRIGL SERPL-MCNC: 159 MG/DL (ref 0–149)
TSH SERPL DL<=0.005 MIU/L-ACNC: 5.18 UIU/ML (ref 0.45–4.5)
VLDLC SERPL CALC-MCNC: 32 MG/DL (ref 5–40)

## 2018-02-27 NOTE — PROGRESS NOTES
Sugar not improving, a little worse, but should improve with con't weight loss. Thyroid pill too weak again too. Cholesterol good. Neg for hep C.

## 2018-03-02 LAB — HEMOCCULT STL QL IA: NEGATIVE

## 2018-03-27 DIAGNOSIS — M19.012 PRIMARY OSTEOARTHRITIS OF LEFT SHOULDER: ICD-10-CM

## 2018-03-27 RX ORDER — MELOXICAM 7.5 MG/1
TABLET ORAL
Qty: 90 TAB | Refills: 2 | Status: SHIPPED | OUTPATIENT
Start: 2018-03-27 | End: 2018-12-02 | Stop reason: SDUPTHER

## 2018-05-22 ENCOUNTER — OFFICE VISIT (OUTPATIENT)
Dept: FAMILY MEDICINE CLINIC | Age: 63
End: 2018-05-22

## 2018-05-22 VITALS
HEART RATE: 93 BPM | RESPIRATION RATE: 16 BRPM | WEIGHT: 209.6 LBS | HEIGHT: 64 IN | BODY MASS INDEX: 35.78 KG/M2 | OXYGEN SATURATION: 100 % | SYSTOLIC BLOOD PRESSURE: 110 MMHG | TEMPERATURE: 97.4 F | DIASTOLIC BLOOD PRESSURE: 78 MMHG

## 2018-05-22 DIAGNOSIS — I10 ESSENTIAL HYPERTENSION WITH GOAL BLOOD PRESSURE LESS THAN 130/85: ICD-10-CM

## 2018-05-22 DIAGNOSIS — Z79.4 TYPE 2 DIABETES MELLITUS WITH INSULIN THERAPY (HCC): Primary | ICD-10-CM

## 2018-05-22 DIAGNOSIS — E03.9 ACQUIRED HYPOTHYROIDISM: ICD-10-CM

## 2018-05-22 DIAGNOSIS — E11.9 TYPE 2 DIABETES MELLITUS WITH INSULIN THERAPY (HCC): Primary | ICD-10-CM

## 2018-05-22 DIAGNOSIS — Z23 ENCOUNTER FOR IMMUNIZATION: ICD-10-CM

## 2018-05-22 DIAGNOSIS — E78.00 PURE HYPERCHOLESTEROLEMIA: ICD-10-CM

## 2018-05-22 PROBLEM — E66.01 SEVERE OBESITY (BMI 35.0-39.9) WITH COMORBIDITY (HCC): Status: ACTIVE | Noted: 2018-05-22

## 2018-05-22 RX ORDER — LOSARTAN POTASSIUM 100 MG/1
100 TABLET ORAL DAILY
Qty: 90 TAB | Refills: 3 | Status: SHIPPED | OUTPATIENT
Start: 2018-05-22 | End: 2019-04-05 | Stop reason: SDUPTHER

## 2018-05-22 RX ORDER — ASPIRIN 81 MG/1
81 TABLET ORAL DAILY
Qty: 90 TAB | Refills: 3
Start: 2018-05-22

## 2018-05-22 NOTE — MR AVS SNAPSHOT
[facility] 
 
 
 [address] [facility] [phone] Patient: Jaclyn Dyer MRN: [id number]:[id number] Visit Information Date & Time Provider Department Dept. Phone Encounter #  
 5/22/2018  1:00 PM Marquis Alvarado MD [address] [place] [address] [id number] Follow-up Instructions Return in about 3 months (around 8/22/2018). Upcoming Health Maintenance Date Due ZOSTER VACCINE AGE 60> 3/29/2016 MICROALBUMIN Q1 3/17/2018 Pneumococcal 19-64 Highest Risk (2 of 3 - PPSV23) 5/22/2018* Influenza Age 5 to Adult 8/1/2018 EYE EXAM RETINAL OR DILATED Q1 8/17/2018 HEMOGLOBIN A1C Q6M 8/26/2018 FOOT EXAM Q1 2/26/2019 LIPID PANEL Q1 2/26/2019 MEDICARE YEARLY EXAM 2/27/2019 FOBT Q 1 YEAR AGE 50-75 2/28/2019 BREAST CANCER SCRN MAMMOGRAM 2/27/2020 PAP AKA CERVICAL CYTOLOGY 3/2/2021 DTaP/Tdap/Td series (2 - Td) 2/26/2028 *Topic was postponed. The date shown is not the original due date. Allergies as of 5/22/2018  Review Complete On: 5/22/2018 By: Marquis Alvarado MD  
  
 Severity Noted Reaction Type Reactions Diovan [Valsartan] Low 05/06/2016   Side Effect Cough Current Immunizations  Reviewed on 2/26/2018 Name Date Influenza Vaccine 2/26/2018, 11/10/2016 Influenza Vaccine (Quad) PF 2/26/2018 Pneumococcal Conjugate (PCV-13) 2/26/2018 Not reviewed this visit You Were Diagnosed With   
  
 Codes Comments Type 2 diabetes mellitus with insulin therapy ([facility] [address]    -  Primary ICD-10-CM: E11.9, Z79.4 ICD-9-CM: 250.00, V58.67 Essential hypertension with goal blood pressure less than 130/85     ICD-10-CM: I10 
ICD-9-CM: 401.9 Acquired hypothyroidism     ICD-10-CM: E03.9 ICD-9-CM: 244.9 Pure hypercholesterolemia     ICD-10-CM: E78.00 ICD-9-CM: 272.0 Encounter for immunization     ICD-10-CM: G40 ICD-9-CM: V03.89 Vitals BP Pulse Temp Resp Height(growth percentile) 110/78 (BP 1 Location: Left arm, BP Patient Position: Sitting) 93 97.4 °F (36.3 °C) (Temporal) 16 5' 4\" (1.626 m) Weight(growth percentile) SpO2 BMI OB Status Smoking Status 209 lb 9.6 oz (95.1 kg) 100% 35.98 kg/m2 Menopause Never Smoker BMI and BSA Data Body Mass Index Body Surface Area 35.98 kg/m 2 2.07 m 2 Preferred Pharmacy Pharmacy Name Phone [address] [facility], [address] [place] [address] [address] Ashleigh Douglass Your Updated Medication List  
  
   
This list is accurate as of 5/22/18  2:01 PM.  Always use your most recent med list.  
  
  
  
  
 aspirin delayed-release 81 mg tablet Take 1 Tab by mouth daily. atorvastatin 40 mg tablet Commonly known as:  LIPITOR Take 1 Tab by mouth daily. Indications: cholesterol and heart BYDUREON 2 mg/0.65 mL Pnij Generic drug:  exenatide microspheres INJECT SUBCUTANEOUSLY 2MG  EVERY 7 DAYS  
  
 canagliflozin-metformin 150-1,000 mg Tbph  
Commonly known as:  INVOKAMET XR Take 2 Tabs by mouth daily. Indications: sugar, new higher dose  
  
 furosemide 40 mg tablet Commonly known as:  LASIX Take 1 Tab by mouth daily. Indications: fluid and heart * levothyroxine 25 mcg tablet Commonly known as:  SYNTHROID Take 1 Tab by mouth Daily (before breakfast). Indications: low thyroid, take with the 200mcg * levothyroxine 200 mcg tablet Commonly known as:  SYNTHROID Take 1 Tab by mouth Daily (before breakfast). Indications: low thyroid, take with the 25mcg  
  
 losartan 100 mg tablet Commonly known as:  COZAAR Take 1 Tab by mouth daily. Indications: pressure, kidney  
  
 meloxicam 7.5 mg tablet Commonly known as:  MOBIC  
TAKE 1 TABLET BY MOUTH  DAILY MYRBETRIQ 50 mg ER tablet Generic drug:  mirabegron ER Take 50 mg by mouth daily. ondansetron 8 mg disintegrating tablet Commonly known as:  ZOFRAN ODT  
 Take 1 Tab by mouth three (3) times daily as needed for Nausea. potassium chloride 10 mEq tablet Commonly known as:  KLOR-CON Take 1 Tab by mouth daily. Indications: mineral  
  
 varicella-zoster recombinant (PF) 50 mcg/0.5 mL Susr injection Commonly known as:  SHINGRIX  
0.5 mL by IntraMUSCular route every 6 months for 2 doses. Indications: prevent shingles ZANTAC 150 mg tablet Generic drug:  raNITIdine Take 150 mg by mouth two (2) times a day. * Notice: This list has 2 medication(s) that are the same as other medications prescribed for you. Read the directions carefully, and ask your doctor or other care provider to review them with you. Prescriptions Printed Refills  
 varicella-zoster recombinant, PF, (SHINGRIX) 50 mcg/0.5 mL susr injection 1 Si.5 mL by IntraMUSCular route every 6 months for 2 doses. Indications: prevent shingles Class: Print Route: IntraMUSCular Prescriptions Sent to Pharmacy Refills  
 losartan (COZAAR) 100 mg tablet 3 Sig: Take 1 Tab by mouth daily. Indications: pressure, kidney Class: Normal  
 Pharmacy: [address] [place] [address] [address] Ph #: [phone] Route: Oral  
  
We Performed the Following AMB POC URINE, MICROALBUMIN, SEMIQUANT (3 RESULTS) [59024 CPT(R)] HEMOGLOBIN A1C WITH EAG [79550 CPT(R)] TSH RFX ON ABNORMAL TO FREE T4 [[id number] Custom] Follow-up Instructions Return in about 3 months (around 2018). Patient Instructions Learning About Low-Carbohydrate Diets for Weight Loss What is a low-carbohydrate diet? Low-carb diets avoid foods that are high in carbohydrate. These high-carb foods include pasta, bread, rice, cereal, fruits, and starchy vegetables. Instead, these diets usually have you eat foods that are high in fat and protein. Many people lose weight quickly on a low-carb diet.  But the early weight loss is water. People on this diet often gain the weight back after they start eating carbs again. Not all diet plans are safe or work well. A lot of the evidence shows that low-carb diets aren't healthy. That's because these diets often don't include healthy foods like fruits and vegetables. Losing weight safely means balancing protein, fat, and carbs with every meal and snack. And low-carb diets don't always provide the vitamins, minerals, and fiber you need. If you have a serious medical condition, talk to your doctor before you try any diet. These conditions include kidney disease, heart disease, type 2 diabetes, high cholesterol, and high blood pressure. If you are pregnant, it may not be safe for your baby if you are on a low-carb diet. How can you lose weight safely? You might have heard that a diet plan helped another person lose weight. But that doesn't mean that it will work for you. It is very hard to stay on a diet that includes lots of big changes in your eating habits. If you want to get to a healthy weight and stay there, making healthy lifestyle changes will often work better than dieting. These steps can help. · Make a plan for change. Work with your doctor to create a plan that is right for you. · See a dietitian. He or she can show you how to make healthy changes in your eating habits. · Manage stress. If you have a lot of stress in your life, it can be hard to focus on making healthy changes to your daily habits. · Track your food and activity. You are likely to do better at losing weight if you keep track of what you eat and what you do. Follow-up care is a key part of your treatment and safety. Be sure to make and go to all appointments, and call your doctor if you are having problems. It's also a good idea to know your test results and keep a list of the medicines you take. Where can you learn more? Go to http://joe-melva.info/. Enter A121 in the search box to learn more about \"Learning About Low-Carbohydrate Diets for Weight Loss. \" Current as of: May 12, 2017 Content Version: 11.4 © 5522-2967 Healthwise, [employer]. Care instructions adapted under license by [employer] (which disclaims liability or warranty for this information). If you have questions about a medical condition or this instruction, always ask your healthcare professional. [address] any warranty or liability for your use of this information. Introducing [facility]! Dear April Rust: Thank you for requesting a [employer] account. Our records indicate that you already have an active [employer] account. You can access your account anytime at [email or web address] Did you know that you can access your hospital and ER discharge instructions at any time in [employer]? You can also review all of your test results from your hospital stay or ER visit. Additional Information If you have questions, please visit the Frequently Asked Questions section of the [employer] website at [email or web address] Remember, [employer] is NOT to be used for urgent needs. For medical emergencies, dial 911. Now available from your iPhone and Android! Please provide this summary of care documentation to your next provider. Your primary care clinician is listed as Liza Martinez. If you have any questions after today's visit, please call [phone].

## 2018-05-22 NOTE — PROGRESS NOTES
1. Have you been to the ER, urgent care clinic since your last visit? Hospitalized since your last visit? No    2. Have you seen or consulted any other health care providers outside of the [address] [place] [address] since your last visit? Include any pap smears or colon screening.  Yes When: Dr. Song Greenwood 2 months ago

## 2018-05-22 NOTE — PROGRESS NOTES
Altagracia Damon is a 65 y.o. female presenting for/with:    Diabetes    HPI:  DM2  Doing well since last visit. We boosted invokamet XR to 2x 150/1000mg pills daily. Taking them regularly now. Still taking bydureon once a week. Sugars running low 100's. Down 8#. Working hard on diet. Lab Results   Component Value Date/Time    Hemoglobin A1c 9.9 (H) 02/26/2018 02:27 PM    Hemoglobin A1c 8.9 (H) 03/17/2017 02:16 PM    Hemoglobin A1c 8.4 (H) 11/10/2016 02:12 PM    Glucose 185 (H) 02/26/2018 02:27 PM    Microalb/Creat ratio (ug/mg creat.) 27.3 03/17/2017 02:16 PM    LDL, calculated 78 02/26/2018 02:27 PM    Creatinine 0.95 02/26/2018 02:27 PM     Last foot check good 2/2018  Last eye check ok with Dr. Tino Brown Fall 2016. Hypertension. Blood pressures have been improving. Management at last visit included continuing current regimen . Current regimen: angiotensin II receptor antagonist and loop diuretic. Symptoms include no sx. Patient denies chest pain, palpitations. Lab review:   Lab Results   Component Value Date/Time    Sodium 140 02/26/2018 02:27 PM    Potassium 4.0 02/26/2018 02:27 PM    Chloride 95 (L) 02/26/2018 02:27 PM    CO2 25 02/26/2018 02:27 PM    Glucose 185 (H) 02/26/2018 02:27 PM    BUN 18 02/26/2018 02:27 PM    Creatinine 0.95 02/26/2018 02:27 PM    BUN/Creatinine ratio 19 02/26/2018 02:27 PM    GFR est AA 75 02/26/2018 02:27 PM    GFR est non-AA 65 02/26/2018 02:27 PM    Calcium 9.7 02/26/2018 02:27 PM     Hypothyroid  Lab Results   Component Value Date/Time    TSH 5.180 (H) 02/26/2018 02:27 PM   A little undertreated on synthroid 225mcg/day, but hadn't been taking it regularly over winter. Due for recheck. PMH, SH, Medications/Allergies: reviewed, on chart.     ROS:  Constitutional: No fever, chills or weight loss  Respiratory: No cough, SOB   CV: No chest pain or Palpitations    Visit Vitals    /78 (BP 1 Location: Left arm, BP Patient Position: Sitting)    Pulse 93    Temp 97.4 °F (36.3 °C) (Temporal)    Resp 16    Ht 5' 4\" (1.626 m)    Wt 209 lb 9.6 oz (95.1 kg)    SpO2 100%    BMI 35.98 kg/m2     Wt Readings from Last 3 Encounters:   05/22/18 209 lb 9.6 oz (95.1 kg)   02/26/18 217 lb (98.4 kg)   03/17/17 219 lb (99.3 kg)   -8#  BP Readings from Last 3 Encounters:   05/22/18 110/78   02/26/18 110/62   03/17/17 133/74     Physical Examination: General appearance - alert, well appearing, and in no distress  Mental status - alert, oriented to person, place, and time  Eyes - pupils equal and reactive, extraocular eye movements intact  ENT - bilateral external ears and nose normal. Normal lips  Neck - supple, no significant adenopathy, no thyromegaly or mass. Incidentally noted polypoid skin papules   Lymphatics - no palpable lymphadenopathy, no hepatosplenomegaly  Chest - clear to auscultation, no wheezes, rales or rhonchi, symmetric air entry  Heart - normal rate, regular rhythm, normal S1, S2, no murmurs, rubs, clicks or gallops  Extremities - peripheral pulses normal, no pedal edema, no clubbing or cyanosis. A/P:  DM2  Feeling good. Working on losing weight (desired) and sugars coming down nicely. Continue invokamet 1000/150 1 BID. Con't bydureon qwk. Recheck A1c. Still thinking about bariatrics, but wants to try buckling down first. Making good progress lately. HTN and Hx Edema  W/C. Edema minimal. Ok to cut lasix to 20mg daily, consider change to oretic 25mg daily. Hypothyroid  Will see if back to goal on 225mcg daily. Recheck, Adjust PRN, next dose up would be 250mcg daily.     F/U 3mo/PRN

## 2018-05-22 NOTE — PATIENT INSTRUCTIONS
Learning About Low-Carbohydrate Diets for Weight Loss  What is a low-carbohydrate diet? Low-carb diets avoid foods that are high in carbohydrate. These high-carb foods include pasta, bread, rice, cereal, fruits, and starchy vegetables. Instead, these diets usually have you eat foods that are high in fat and protein. Many people lose weight quickly on a low-carb diet. But the early weight loss is water. People on this diet often gain the weight back after they start eating carbs again. Not all diet plans are safe or work well. A lot of the evidence shows that low-carb diets aren't healthy. That's because these diets often don't include healthy foods like fruits and vegetables. Losing weight safely means balancing protein, fat, and carbs with every meal and snack. And low-carb diets don't always provide the vitamins, minerals, and fiber you need. If you have a serious medical condition, talk to your doctor before you try any diet. These conditions include kidney disease, heart disease, type 2 diabetes, high cholesterol, and high blood pressure. If you are pregnant, it may not be safe for your baby if you are on a low-carb diet. How can you lose weight safely? You might have heard that a diet plan helped another person lose weight. But that doesn't mean that it will work for you. It is very hard to stay on a diet that includes lots of big changes in your eating habits. If you want to get to a healthy weight and stay there, making healthy lifestyle changes will often work better than dieting. These steps can help. · Make a plan for change. Work with your doctor to create a plan that is right for you. · See a dietitian. He or she can show you how to make healthy changes in your eating habits. · Manage stress. If you have a lot of stress in your life, it can be hard to focus on making healthy changes to your daily habits. · Track your food and activity.  You are likely to do better at losing weight if you keep track of what you eat and what you do. Follow-up care is a key part of your treatment and safety. Be sure to make and go to all appointments, and call your doctor if you are having problems. It's also a good idea to know your test results and keep a list of the medicines you take. Where can you learn more? Go to http://joe-melva.info/. Enter A121 in the search box to learn more about \"Learning About Low-Carbohydrate Diets for Weight Loss. \"  Current as of: May 12, 2017  Content Version: 11.4  © 9818-2360 Healthwise, [employer]. Care instructions adapted under license by [employer] (which disclaims liability or warranty for this information). If you have questions about a medical condition or this instruction, always ask your healthcare professional. [address] any warranty or liability for your use of this information.

## 2018-05-24 LAB
EST. AVERAGE GLUCOSE BLD GHB EST-MCNC: 217 MG/DL
HBA1C MFR BLD: 9.2 % (ref 4.8–5.6)
T4 FREE SERPL-MCNC: 2.48 NG/DL (ref 0.82–1.77)
TSH SERPL DL<=0.005 MIU/L-ACNC: 0.02 UIU/ML (ref 0.45–4.5)

## 2018-05-24 NOTE — PROGRESS NOTES
Thyroid pill too strong now, cut to just 200mcg daily. Sugar a little better. Probably can avoid insulin if keeps losing weight about 4# per month.

## 2018-08-10 DIAGNOSIS — E03.9 ACQUIRED HYPOTHYROIDISM: ICD-10-CM

## 2018-08-13 DIAGNOSIS — E11.9 TYPE 2 DIABETES MELLITUS WITHOUT COMPLICATION (HCC): ICD-10-CM

## 2018-08-13 RX ORDER — LEVOTHYROXINE SODIUM 25 UG/1
TABLET ORAL
Qty: 90 TAB | OUTPATIENT
Start: 2018-08-13

## 2018-08-13 RX ORDER — LEVOTHYROXINE SODIUM 200 UG/1
TABLET ORAL
Qty: 90 TAB | Refills: 0 | Status: SHIPPED | OUTPATIENT
Start: 2018-08-13 | End: 2018-11-01 | Stop reason: SDUPTHER

## 2018-08-14 RX ORDER — EXENATIDE 2 MG/.65ML
INJECTION, SUSPENSION, EXTENDED RELEASE SUBCUTANEOUS
Qty: 12 PEN | Refills: 0 | Status: SHIPPED | OUTPATIENT
Start: 2018-08-14 | End: 2018-10-27 | Stop reason: SDUPTHER

## 2019-04-05 DIAGNOSIS — I10 ESSENTIAL HYPERTENSION WITH GOAL BLOOD PRESSURE LESS THAN 130/85: ICD-10-CM

## 2019-04-08 RX ORDER — LOSARTAN POTASSIUM 100 MG/1
TABLET ORAL
Qty: 90 TAB | Refills: 3 | Status: SHIPPED | OUTPATIENT
Start: 2019-04-08 | End: 2020-02-10

## 2019-04-11 ENCOUNTER — OFFICE VISIT (OUTPATIENT)
Dept: SURGERY | Age: 64
End: 2019-04-11

## 2019-04-11 VITALS
HEART RATE: 80 BPM | HEIGHT: 64 IN | SYSTOLIC BLOOD PRESSURE: 148 MMHG | DIASTOLIC BLOOD PRESSURE: 93 MMHG | WEIGHT: 225.6 LBS | OXYGEN SATURATION: 97 % | TEMPERATURE: 97.3 F | BODY MASS INDEX: 38.51 KG/M2

## 2019-04-11 DIAGNOSIS — M79.89 SOFT TISSUE MASS: Primary | ICD-10-CM

## 2019-04-11 DIAGNOSIS — E66.09 CLASS 2 OBESITY DUE TO EXCESS CALORIES WITH BODY MASS INDEX (BMI) OF 38.0 TO 38.9 IN ADULT, UNSPECIFIED WHETHER SERIOUS COMORBIDITY PRESENT: ICD-10-CM

## 2019-04-11 NOTE — Clinical Note
4/19/19 Patient: Abran Steele YOB: 1955 Date of Visit: 4/11/2019 Nori Casiano MD 
[address] [facility] [address] VIA In Basket Dear Nori Casiano MD, Thank you [facility]. My notes for this consultation are attached. If you have questions, please do not hesitate to call me. I look forward to following your patient along with you. Sincerely, Andrew Walker MD

## 2019-04-11 NOTE — PROGRESS NOTES
HISTORY OF PRESENT ILLNESS Alvin Vernon is a 59 y.o. female. Mass anterior hip/thigh No pain 
 
 
____________________________________________________________________________ Patient presents with: 
Skin Problem: SEEN AT THE REQUEST OF DR. CHOUDHURY, FOR POSSIBLE LIPOMA ON LT. THIGH 
 
BP (!) 148/93 (BP 1 Location: Left arm, BP Patient Position: Sitting)   Pulse 80   Temp 97.3 °F (36.3 °C)   Ht 5' 4\" (1.626 m)   Wt 102.3 kg (225 lb 9.6 oz)   SpO2 97%   BMI 38.72 kg/m² Past Medical History: 
No date: Burning with urination Comment:  OVER ACTIVE BLADDER 
5/6/2016: Colon polyp Comment:  2013, due for recheck 2018  
5/6/2016: Essential hypertension with goal blood pressure less than  
130/85 
5/6/2016: Gastroesophageal reflux disease without esophagitis Comment:  No frazier's on EGD 2008 No date: GERD (gastroesophageal reflux disease) 5/6/2016: Malignant neoplasm of central portion of left female breast  
[facility] Comment:  S/p lumpectomy 2004 at [facility], nodes clean. DCIS  
             per pt. Had breast reduction at [facility] 2012. no  
             chemo needed. 5/6/2016: Pure hypercholesterolemia No date: Seizures ([facility] No date: Thyroid disease 5/6/2016: Type 2 diabetes mellitus without complication ([facility] Past Surgical History: 
2010: CARDIAC SURG PROCEDURE UNLIST Comment:  ABLATION 
2004: HX BREAST LUMPECTOMY; Left Comment:  \"below stage 1 cancer\" ? DCIS 
2010: HX BREAST REDUCTION; Bilateral 
    Comment:  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 Social Needs Financial resource strain: Not hard at all Food insecurity: 
      Worry: Never true Inability: Never true Transportation needs: 
      Medical: No 
      Non-medical: No 
  Tobacco Use Smoking status: Never Smoker Smokeless tobacco: Never Used Substance and Sexual Activity Alcohol use: No 
      Alcohol/week: 0.0 oz Frequency: Never Drinks per session: Patient refused Binge frequency: Never Drug use: No 
    Sexual activity: Never Lifestyle Physical activity: 
      Days per week: 0 days Minutes per session: 0 min Stress: Only a little Relationships Social connections: 
      Talks on phone: More than three times a week Gets together: More than three times a week Attends Sikh service: More than 4 times per year Active member of club or organization: Yes Attends meetings of clubs or organizations: More than 4 times per year Relationship status:  Other Topics Concerns: 
       Service: No 
      Blood Transfusions: No 
      Caffeine Concern: No 
      Occupational Exposure: No 
      Hobby Hazards: No 
      Sleep Concern: No 
      Stress Concern: No 
      Weight Concern: Yes Special Diet: Yes 
        diabetic Back Care: Yes Exercise: No 
      Bike Helmet: No 
      Seat Belt: Yes Self-Exams: Yes Review of patient's family history indicates: 
Problem: Colon Cancer Relation: Paternal Aunt Age of Onset: (Not Specified) Problem: Hypertension Relation: Paternal Aunt Age of Onset: (Not Specified) Problem: Diabetes Relation: Paternal Aunt Age of Onset: (Not Specified) Problem: Cancer Relation: Paternal Aunt Age of Onset: (Not Specified) Problem: No Known Problems Relation: Mother Age of Onset: (Not Specified) Problem: Diabetes Relation: Father Age of Onset: (Not Specified) Problem: Cancer Relation: Maternal Grandfather Age of Onset: (Not Specified) Problem: Heart Disease Relation: Paternal Grandmother Age of Onset: (Not Specified) Problem: Stroke Relation: Paternal Grandmother Age of Onset: (Not Specified) Problem: Diabetes Relation: Paternal Grandmother Age of Onset: (Not Specified) Current Outpatient Medications: 
losartan (COZAAR) 100 mg tablet, TAKE 1 TABLET BY MOUTH  DAILY. atorvastatin (LIPITOR) 40 mg tablet, Take 1 Tab by mouth daily. potassium chloride SR (KLOR-CON 10) 10 mEq tablet, TAKE 1 TABLET BY MOUTH  DAILY 
furosemide (LASIX) 40 mg tablet, TAKE 1 TABLET BY MOUTH  DAILY exenatide microspheres (BYDUREON BCISE) 2 mg/0.85 mL atIn, 1 injection weekly for diabetes 
meloxicam (MOBIC) 7.5 mg tablet, TAKE 1 TABLET BY MOUTH  DAILY 
levothyroxine (SYNTHROID) 175 mcg tablet, Take 1 Tab by mouth Daily (before breakfast). canagliflozin-metformin (INVOKAMET XR) 150-1,000 mg TBph, Take 2 Tabs by mouth daily. mirabegron ER (MYRBETRIQ) 50 mg ER tablet, Take 50 mg by mouth daily. aspirin delayed-release 81 mg tablet, Take 1 Tab by mouth daily. raNITIdine (ZANTAC) 150 mg tablet, Take 150 mg by mouth two (2) times a day. ondansetron (ZOFRAN ODT) 8 mg disintegrating tablet, Take 1 Tab by mouth three (3) times daily as needed for Nausea. No current facility-administered medications for this visit. Allergies:  -- Diovan (Valsartan) -- Cough 
_____________________________________________________________________________ Skin Problem The history is provided by the patient. This is a new problem. The current episode started more than 1 week ago. The problem occurs constantly. The problem has not changed since onset. Pertinent negatives include no chest pain, no abdominal pain, no headaches and no shortness of breath. Nothing aggravates the symptoms. Nothing relieves the symptoms. The treatment provided no relief. Review of Systems Constitutional: Negative for chills, fever and weight loss. HENT: Negative for ear pain. Eyes: Negative for pain. Respiratory: Negative for shortness of breath. Cardiovascular: Negative for chest pain. Gastrointestinal: Negative for abdominal pain and blood in stool. Genitourinary: Negative for hematuria. Musculoskeletal: Negative for joint pain. Skin: Negative for rash. Neurological: Negative for dizziness, focal weakness, seizures and headaches. Endo/Heme/Allergies: Does not bruise/bleed easily. Psychiatric/Behavioral: The patient does not have insomnia. Physical Exam  
Constitutional: She is oriented to person, place, and time. She appears well-developed and well-nourished. No distress. HENT:  
Head: Normocephalic and atraumatic. Mouth/Throat: No oropharyngeal exudate. Eyes: Pupils are equal, round, and reactive to light. Neck: Normal range of motion. No tracheal deviation present. Cardiovascular: Normal rate, regular rhythm and normal heart sounds. No murmur heard. Pulmonary/Chest: Effort normal and breath sounds normal. No respiratory distress. She has no wheezes. Abdominal: Soft. Bowel sounds are normal. She exhibits no distension and no mass. There is no tenderness. There is no rebound and no guarding. Musculoskeletal: Normal range of motion. She exhibits no edema or tenderness. Lymphadenopathy:  
  She has no cervical adenopathy. Neurological: She is alert and oriented to person, place, and time. Skin: Skin is warm. No rash noted. She is not diaphoretic. No erythema. +obesity. Some asymmetry of fat deposits anterior hip L side. Difficult to palpate any distinct mass. Psychiatric: She has a normal mood and affect. Her behavior is normal.  
 
 
ASSESSMENT and PLAN 
  ICD-10-CM ICD-9-CM 1. Soft tissue mass M79.9 729.90 US EXT NONVAS LT LTD 2.  Class 2 obesity due to excess calories with body mass index (BMI) of 38.0 to 38.9 in adult, unspecified whether serious comorbidity present E66.09 278.00   
 Z68.38 V85.38 Reviewed DDx with Pretty Byrd Any distinct mass is not obvious on my exam today. Will get an US to rule out any underlying pathology. Counseled on wt loss. Further management after the US. Expressed understanding. Thank you for this consult. Addendum 4/19: reviewed US with Lizzie Byrd. Agreeable to observe the area.

## 2019-04-11 NOTE — PROGRESS NOTES
Chief Complaint Patient presents with  
 Skin Problem SEEN AT THE REQUEST OF DR. CHOUDHURY, FOR POSSIBLE LIPOMA ON LT. THIGH 1. Have you been to the ER, urgent care clinic since your last visit? Hospitalized since your last visit? 1/18/18 2. Have you seen or consulted any other health care providers outside of the [address] [place] [address] since your last visit? Include any pap smears or colon screening.  NO

## 2019-04-16 ENCOUNTER — HOSPITAL ENCOUNTER (OUTPATIENT)
Dept: ULTRASOUND IMAGING | Age: 64
Discharge: HOME | End: 2019-04-16
Attending: SURGERY
Payer: MEDICARE

## 2019-04-16 DIAGNOSIS — M79.89 SOFT TISSUE MASS: ICD-10-CM

## 2019-04-16 PROCEDURE — 76882 US LMTD JT/FCL EVL NVASC XTR: CPT

## 2019-04-19 ENCOUNTER — TELEPHONE (OUTPATIENT)
Dept: SURGERY | Age: 64
End: 2019-04-19

## 2019-04-19 NOTE — TELEPHONE ENCOUNTER
Reviewed US: just subcutaneous fat. No masses, lesions, etc.    No symptoms    Would observe for now. Cont wt loss. Expressed understanding.

## 2020-06-10 PROBLEM — K62.5 RECTAL BLEEDING: Status: ACTIVE | Noted: 2020-06-10

## 2020-06-10 PROBLEM — E78.2 MIXED HYPERLIPIDEMIA: Status: ACTIVE | Noted: 2020-06-10

## 2020-07-02 ENCOUNTER — OFFICE VISIT (OUTPATIENT)
Dept: PRIMARY CARE CLINIC | Age: 65
End: 2020-07-02

## 2020-07-02 DIAGNOSIS — Z20.828 EXPOSURE TO SARS-ASSOCIATED CORONAVIRUS: Primary | ICD-10-CM

## 2020-07-02 NOTE — PROGRESS NOTES
Pt is having a procedure next week. Doctor is requesting a covid test prior to procedure. Pt declined to see a doctor today.  KT

## 2020-07-07 ENCOUNTER — HOSPITAL ENCOUNTER (OUTPATIENT)
Age: 65
Setting detail: OUTPATIENT SURGERY
Discharge: HOME | End: 2020-07-07
Attending: SPECIALIST | Admitting: SPECIALIST
Payer: MEDICARE

## 2020-07-07 ENCOUNTER — ANESTHESIA EVENT (OUTPATIENT)
Dept: ENDOSCOPY | Age: 65
End: 2020-07-07
Payer: MEDICARE

## 2020-07-07 ENCOUNTER — ANESTHESIA (OUTPATIENT)
Dept: ENDOSCOPY | Age: 65
End: 2020-07-07
Payer: MEDICARE

## 2020-07-07 VITALS
WEIGHT: 232 LBS | SYSTOLIC BLOOD PRESSURE: 127 MMHG | TEMPERATURE: 97.6 F | DIASTOLIC BLOOD PRESSURE: 86 MMHG | OXYGEN SATURATION: 100 % | RESPIRATION RATE: 12 BRPM | BODY MASS INDEX: 39.82 KG/M2 | HEART RATE: 66 BPM

## 2020-07-07 LAB
GLUCOSE BLD STRIP.AUTO-MCNC: 153 MG/DL (ref 65–100)
H PYLORI FROM TISSUE: NEGATIVE
KIT LOT NO., HCLOLOT: NORMAL
NEGATIVE CONTROL: NEGATIVE
POSITIVE CONTROL: POSITIVE
SERVICE CMNT-IMP: ABNORMAL

## 2020-07-07 PROCEDURE — 77030021593 HC FCPS BIOP ENDOSC BSC -A: Performed by: SPECIALIST

## 2020-07-07 PROCEDURE — 74011000250 HC RX REV CODE- 250: Performed by: NURSE ANESTHETIST, CERTIFIED REGISTERED

## 2020-07-07 PROCEDURE — 76060000032 HC ANESTHESIA 0.5 TO 1 HR: Performed by: SPECIALIST

## 2020-07-07 PROCEDURE — 74011250636 HC RX REV CODE- 250/636: Performed by: NURSE ANESTHETIST, CERTIFIED REGISTERED

## 2020-07-07 PROCEDURE — 88305 TISSUE EXAM BY PATHOLOGIST: CPT

## 2020-07-07 PROCEDURE — 82962 GLUCOSE BLOOD TEST: CPT

## 2020-07-07 PROCEDURE — 76040000007: Performed by: SPECIALIST

## 2020-07-07 PROCEDURE — 87077 CULTURE AEROBIC IDENTIFY: CPT | Performed by: SPECIALIST

## 2020-07-07 RX ORDER — SODIUM CHLORIDE 0.9 % (FLUSH) 0.9 %
5-40 SYRINGE (ML) INJECTION EVERY 8 HOURS
Status: DISCONTINUED | OUTPATIENT
Start: 2020-07-07 | End: 2020-07-07 | Stop reason: HOSPADM

## 2020-07-07 RX ORDER — FLUMAZENIL 0.1 MG/ML
0.2 INJECTION INTRAVENOUS
Status: DISCONTINUED | OUTPATIENT
Start: 2020-07-07 | End: 2020-07-07 | Stop reason: HOSPADM

## 2020-07-07 RX ORDER — EPINEPHRINE 0.1 MG/ML
1 INJECTION INTRACARDIAC; INTRAVENOUS
Status: DISCONTINUED | OUTPATIENT
Start: 2020-07-07 | End: 2020-07-07 | Stop reason: HOSPADM

## 2020-07-07 RX ORDER — MIDAZOLAM HYDROCHLORIDE 1 MG/ML
.25-5 INJECTION, SOLUTION INTRAMUSCULAR; INTRAVENOUS
Status: DISCONTINUED | OUTPATIENT
Start: 2020-07-07 | End: 2020-07-07 | Stop reason: HOSPADM

## 2020-07-07 RX ORDER — FENTANYL CITRATE 50 UG/ML
12.5-5 INJECTION, SOLUTION INTRAMUSCULAR; INTRAVENOUS
Status: DISCONTINUED | OUTPATIENT
Start: 2020-07-07 | End: 2020-07-07 | Stop reason: HOSPADM

## 2020-07-07 RX ORDER — SODIUM CHLORIDE 0.9 % (FLUSH) 0.9 %
5-40 SYRINGE (ML) INJECTION AS NEEDED
Status: DISCONTINUED | OUTPATIENT
Start: 2020-07-07 | End: 2020-07-07 | Stop reason: HOSPADM

## 2020-07-07 RX ORDER — NALOXONE HYDROCHLORIDE 0.4 MG/ML
0.4 INJECTION, SOLUTION INTRAMUSCULAR; INTRAVENOUS; SUBCUTANEOUS
Status: DISCONTINUED | OUTPATIENT
Start: 2020-07-07 | End: 2020-07-07 | Stop reason: HOSPADM

## 2020-07-07 RX ORDER — LIDOCAINE HYDROCHLORIDE 20 MG/ML
INJECTION, SOLUTION EPIDURAL; INFILTRATION; INTRACAUDAL; PERINEURAL AS NEEDED
Status: DISCONTINUED | OUTPATIENT
Start: 2020-07-07 | End: 2020-07-07 | Stop reason: HOSPADM

## 2020-07-07 RX ORDER — SODIUM CHLORIDE 9 MG/ML
50 INJECTION, SOLUTION INTRAVENOUS CONTINUOUS
Status: DISCONTINUED | OUTPATIENT
Start: 2020-07-07 | End: 2020-07-07 | Stop reason: HOSPADM

## 2020-07-07 RX ORDER — OMEPRAZOLE 40 MG/1
40 CAPSULE, DELAYED RELEASE ORAL DAILY
COMMUNITY
End: 2022-01-11 | Stop reason: SDUPTHER

## 2020-07-07 RX ORDER — ATROPINE SULFATE 0.1 MG/ML
0.5 INJECTION INTRAVENOUS
Status: DISCONTINUED | OUTPATIENT
Start: 2020-07-07 | End: 2020-07-07 | Stop reason: HOSPADM

## 2020-07-07 RX ORDER — DEXTROMETHORPHAN/PSEUDOEPHED 2.5-7.5/.8
1.2 DROPS ORAL
Status: DISCONTINUED | OUTPATIENT
Start: 2020-07-07 | End: 2020-07-07 | Stop reason: HOSPADM

## 2020-07-07 RX ORDER — SODIUM CHLORIDE 9 MG/ML
INJECTION, SOLUTION INTRAVENOUS
Status: DISCONTINUED | OUTPATIENT
Start: 2020-07-07 | End: 2020-07-07 | Stop reason: HOSPADM

## 2020-07-07 RX ORDER — PROPOFOL 10 MG/ML
INJECTION, EMULSION INTRAVENOUS AS NEEDED
Status: DISCONTINUED | OUTPATIENT
Start: 2020-07-07 | End: 2020-07-07 | Stop reason: HOSPADM

## 2020-07-07 RX ORDER — CHOLECALCIFEROL (VITAMIN D3) 125 MCG
CAPSULE ORAL
COMMUNITY
End: 2020-09-08 | Stop reason: ALTCHOICE

## 2020-07-07 RX ADMIN — PROPOFOL 20 MG: 10 INJECTION, EMULSION INTRAVENOUS at 12:31

## 2020-07-07 RX ADMIN — PROPOFOL 20 MG: 10 INJECTION, EMULSION INTRAVENOUS at 12:18

## 2020-07-07 RX ADMIN — PROPOFOL 30 MG: 10 INJECTION, EMULSION INTRAVENOUS at 12:34

## 2020-07-07 RX ADMIN — PROPOFOL 40 MG: 10 INJECTION, EMULSION INTRAVENOUS at 12:15

## 2020-07-07 RX ADMIN — PROPOFOL 30 MG: 10 INJECTION, EMULSION INTRAVENOUS at 12:39

## 2020-07-07 RX ADMIN — SODIUM CHLORIDE: 900 INJECTION, SOLUTION INTRAVENOUS at 12:11

## 2020-07-07 RX ADMIN — PROPOFOL 30 MG: 10 INJECTION, EMULSION INTRAVENOUS at 12:24

## 2020-07-07 RX ADMIN — LIDOCAINE HYDROCHLORIDE 40 MG: 20 INJECTION, SOLUTION EPIDURAL; INFILTRATION; INTRACAUDAL; PERINEURAL at 12:10

## 2020-07-07 RX ADMIN — PROPOFOL 30 MG: 10 INJECTION, EMULSION INTRAVENOUS at 12:28

## 2020-07-07 RX ADMIN — PROPOFOL 30 MG: 10 INJECTION, EMULSION INTRAVENOUS at 12:20

## 2020-07-07 NOTE — ANESTHESIA POSTPROCEDURE EVALUATION
Post-Anesthesia Evaluation and Assessment    Patient: Young Franklin MRN: [id number]  SSN: [id number]    YOB: 1955  Age: 60 y.o. Sex: female      I have evaluated the patient and they are stable and ready for discharge from the PACU. Cardiovascular Function/Vital Signs  Visit Vitals  /74   Pulse 69   Temp 36.4 °C (97.6 °F)   Resp 14   Wt 105.2 kg (232 lb)   SpO2 100%   Breastfeeding No   BMI 39.82 kg/m²       Patient is status post MAC anesthesia for Procedure(s):  ESOPHAGOGASTRODUODENOSCOPY (EGD)  COLONOSCOPY  ESOPHAGOGASTRODUODENAL (EGD) BIOPSY. Nausea/Vomiting: None    Postoperative hydration reviewed and adequate. Pain:  Pain Scale 1: Numeric (0 - 10) (07/07/20 1258)  Pain Intensity 1: 0 (07/07/20 1258)   Managed    Neurological Status: At baseline    Mental Status, Level of Consciousness: Alert and  oriented to person, place, and time    Pulmonary Status:   O2 Device: Room air (07/07/20 1258)   Adequate oxygenation and airway patent    Complications related to anesthesia: None    Post-anesthesia assessment completed. No concerns    Signed By: Whit Marie MD     July 7, 2020              Procedure(s):  ESOPHAGOGASTRODUODENOSCOPY (EGD)  COLONOSCOPY  ESOPHAGOGASTRODUODENAL (EGD) BIOPSY. MAC    <BSHSIANPOST>    INITIAL Post-op Vital signs:   Vitals Value Taken Time   /86 7/7/2020  1:14 PM   Temp 36.4 °C (97.6 °F) 7/7/2020  1:08 PM   Pulse 66 7/7/2020  1:15 PM   Resp 0 7/7/2020  1:15 PM   SpO2 100 % 7/7/2020  1:15 PM   Vitals shown include unvalidated device data.

## 2020-07-07 NOTE — PROCEDURES
EGD Procedure Note    Indications:  GERD   Referring Physician: Argentina Craft MD   Anesthesia/Sedation:MAC  Endoscopist:  Dr. Nir Mistry  Assistant:  Endoscopy Technician-1: Josh Vasquez  Endoscopy RN-1: Jaymie Florez RN  Surgical Assistant: None  Implants: None      Preoperative diagnosis: Abdominal pain    Postoperative diagnosis: 1. - Gastritis  2. - Irregular Z-line  3. - Universal Diverticulosis, Moderate Sigmoid Diverticulosis-suspect this was the cause of her recent prominent rectal bleed  4. - Small Internal Hemorrhoids      Procedure in Detail:  Informed consent was obtained for the procedure, including sedation. Risks of perforation, hemorrhage, adverse drug reaction, and aspiration were discussed. The patient was placed in the left lateral decubitus position. Based on the pre-procedure assessment, including review of the patient's medical history, medications, allergies, and review of systems, she had been deemed to be an appropriate candidate for moderate sedation; she was therefore sedated with the medications listed above. The patient was monitored continuously with ECG tracing, pulse oximetry, blood pressure monitoring, and direct observations. An Olympus video endoscope was inserted into the mouth and advanced under direct vision to into the esophagus, then stomach and duodenum. A careful inspection was made as the gastroscope was withdrawn, including a retroflexed view of the proximal stomach; findings and interventions are described below. Findings:   Esophagus:irregular Z-line Bx  Stomach:mild antritis with coffee ground streaking - Pyloritek  Duodenum/jejunum: normal    Therapies:  See above    Specimens: see above           EBL: None    Complications:   None; patient tolerated the procedure well. Recommendations:  -Acid suppression with a proton pump inhibitor. , -Await pathology. , -Await AKASH test result and treat for Helicobacter pylori if positive. , -Follow up with Dr Bertin Lynn. , -No NSAIDS    Bernadette Bryant MD                 Colonoscopy Procedure Note    Indications:   Lower rectal bleeding, PHx of colon polyps  Referring Physician: Basil Walsh MD   Anesthesia/Sedation:MAC  Endoscopist:  Dr. Caryle Sam  Assistant:  Endoscopy Technician-1: Sofi Snowden  Endoscopy RN-1: Phylicia Saldana RN  Surgical Assistant: None  Implants: None    Preoperative diagnosis: Abdominal pain    Postoperative diagnosis: 1. - Gastritis  2. - Irregular Z-line  3. - Universal Diverticulosis, Moderate Sigmoid Diverticulosis  4. - Small Internal Hemorrhoids      Procedure in Detail:  Informed consent was obtained for the procedure, including sedation. Risks of perforation, hemorrhage, adverse drug reaction, and aspiration were discussed. The patient was placed in the left lateral decubitus position. Based on the pre-procedure assessment, including review of the patient's medical history, medications, allergies, and review of systems, she had been deemed to be an appropriate candidate for moderate sedation; she was therefore sedated with the medications listed above. The patient was monitored continuously with ECG tracing, pulse oximetry, blood pressure monitoring, and direct observations. A rectal examination was performed. The [id number] was inserted into the rectum and advanced under direct vision to the cecum, which was identified by the ileocecal valve and appendiceal orifice. The quality of the colonic preparation was excellent. A careful inspection was made as the colonoscope was withdrawn, including a retroflexed view of the rectum; findings and interventions are described below. Findings:   Rectum: normal, tiny internal hemorrhid  Sigmoid: moderate diverticulosis  Descending Colon: mild diverticulosis;  Transverse Colon: mild diverticulosis; Ascending Colon: mild diverticulosis;   Cecum: normal  Terminal Ileum: not intubated    Specimens:     none    EBL: None    Complications: None; patient tolerated the procedure well. Recommendations:     - Repeat colonoscopy in 5 years. - If < 10 years, reason: above average risk patient   -Acid suppression with a proton pump inhibitor. , -Await pathology. , -Await AKASH test result and treat for Helicobacter pylori if positive. , -Follow up with Dr Alvia Krabbe. , -No NSAIDS  - she had large bloody bowel movement since there is minimal evidence of hemorrh suspect this was diverticular in origin    Signed By: Elodia Juárez MD                        July 7, 2020

## 2020-07-07 NOTE — PROGRESS NOTES

## 2020-07-07 NOTE — DISCHARGE INSTRUCTIONS
Hari Muñoz  [id number]  1955    COLON DISCHARGE INSTRUCTIONS  Discomfort:  Redness at IV site- apply warm compress to area; if redness or soreness persist- contact your physician  There may be a slight amount of blood passed from the rectum  Gaseous discomfort- walking, belching will help relieve any discomfort  You may not operate a vehicle for 12 hours  You may not engage in an occupation involving machinery or appliances for rest of today  You may not drink alcoholic beverages for at least 12 hours  Avoid making any critical decisions for at least 24 hour  DIET:   Regular diet. - however -  remember your colon is empty and a heavy meal will produce gas. Avoid these foods:  vegetables, fried / greasy foods, carbonated drinks for today. ACTIVITY:  You may resume your normal daily activities it is recommended that you spend the remainder of the day resting -  avoid any strenuous activity. CALL M.D. ANY SIGN OF:  Increasing pain, nausea, vomiting  Abdominal distension (swelling)  New increased bleeding (oral or rectal)  Fever (chills)  Pain in chest area  Bloody discharge from nose or mouth  Shortness of breath    You may not  take any Advil, Aspirin, Ibuprofen, Motrin, Aleve, Goodys, or any similar pain or arthritis products unless MD recommended, ONLY  Tylenol as needed for pain. Follow-up Instructions:   Call Dr. Reid Stage  Results of procedure / biopsy in 10-14days   Office telephone for problems or questions [phone]      - Repeat colonoscopy in 5 years. - If < 10 years, reason: above average risk patient   -Acid suppression with a proton pump inhibitor. , -Await pathology. , -Await AKASH test result and treat for Helicobacter pylori if positive. , -Follow up with Dr Winnie Watters. , -No NSAIDS    Patient Education   Patient Education        Diverticulosis: Care Instructions  Your Care Instructions  In diverticulosis, pouches called diverticula form in the wall of the large intestine (colon). The pouches do not cause any pain or other symptoms. Most people who have diverticulosis do not know they have it. But the pouches sometimes bleed, and if they become infected, they can cause pain and other symptoms. When this happens, it is called diverticulitis. Diverticula form when pressure pushes the wall of the colon outward at certain weak points. A diet that is too low in fiber can cause diverticula. Follow-up care is a key part of your treatment and safety. Be sure to make and go to all appointments, and call your doctor if you are having problems. It's also a good idea to know your test results and keep a list of the medicines you take. How can you care for yourself at home? · Include fruits, leafy green vegetables, beans, and whole grains in your diet each day. These foods are high in fiber. · Take a fiber supplement, such as Citrucel or Metamucil, every day if needed. Read and follow all instructions on the label. · Drink plenty of fluids, enough so that your urine is light yellow or clear like water. If you have kidney, heart, or liver disease and have to limit fluids, talk with your doctor before you increase the amount of fluids you drink. · Get at least 30 minutes of exercise on most days of the week. Walking is a good choice. You also may want to do other activities, such as running, swimming, cycling, or playing tennis or team sports. · Cut out foods that cause gas, pain, or other symptoms. When should you call for help? Call your doctor now or seek immediate medical care if:  · You have belly pain. · You pass maroon or very bloody stools. · You have a fever. · You have nausea and vomiting. · You have unusual changes in your bowel movements or abdominal swelling. · You have burning pain when you urinate. · You have abnormal vaginal discharge. · You have shoulder pain. · You have cramping pain that does not get better when you have a bowel movement or pass gas.   · You pass gas or stool from your urethra while urinating. Watch closely for changes in your health, and be sure to contact your doctor if you have any problems. Where can you learn more? Go to http://www.gray.com/  Enter [id number] in the search box to learn more about \"Diverticulosis: Care Instructions. \"  Current as of: August 12, 2019               Content Version: 12.5  © 9066-0944 [employer]. Care instructions adapted under license by [employer] (which disclaims liability or warranty for this information). If you have questions about a medical condition or this instruction, always ask your healthcare professional. [place] [address] any warranty or liability for your use of this information. Gastritis: Care Instructions  Your Care Instructions     Gastritis is a sore and upset stomach. It happens when something irritates the stomach lining. Many things can cause it. These include an infection such as the flu or something you ate or drank. Medicines or a sore on the lining of the stomach (ulcer) also can cause it. Your belly may bloat and ache. You may belch, vomit, and feel sick to your stomach. You should be able to relieve the problem by taking medicine. And it may help to change your diet. If gastritis lasts, your doctor may prescribe medicine. Follow-up care is a key part of your treatment and safety. Be sure to make and go to all appointments, and call your doctor if you are having problems. It's also a good idea to know your test results and keep a list of the medicines you take. How can you care for yourself at home? · If your doctor prescribed antibiotics, take them as directed. Do not stop taking them just because you feel better. You need to take the full course of antibiotics. · Be safe with medicines. If your doctor prescribed medicine to decrease stomach acid, take it as directed.  Call your doctor if you think you are having a problem with your medicine. · Do not take any other medicine, including over-the-counter pain relievers, without talking to your doctor first.  · If your doctor recommends over-the-counter medicine to reduce stomach acid, such as Pepcid AC (famotidine), Prilosec (omeprazole), or Tagamet HB (cimetidine) follow the directions on the label. · Drink plenty of fluids (enough so that your urine is light yellow or clear like water) to prevent dehydration. Choose water and other caffeine-free clear liquids. If you have kidney, heart, or liver disease and have to limit fluids, talk with your doctor before you increase the amount of fluids you drink. · Limit how much alcohol you drink. · Avoid coffee, tea, cola drinks, chocolate, and other foods with caffeine. They increase stomach acid. When should you call for help? [id number] anytime you think you may need emergency care. For example, call if:  · You vomit blood or what looks like coffee grounds. · You pass maroon or very bloody stools. Call your doctor now or seek immediate medical care if:  · You start breathing fast and have not produced urine in the last 8 hours. · You cannot keep fluids down. Watch closely for changes in your health, and be sure to contact your doctor if:  · You do not get better as expected. Where can you learn more? Go to http://joe-melva.info/  Enter Z536 in the search box to learn more about \"Gastritis: Care Instructions. \"  Current as of: August 12, 2019               Content Version: 12.5  © 6673-0209 [employer]. Care instructions adapted under license by [employer] (which disclaims liability or warranty for this information). If you have questions about a medical condition or this instruction, always ask your healthcare professional. [address] any warranty or liability for your use of this information.

## 2020-07-07 NOTE — H&P
Pre-endoscopy H and P for Colonoscopy    The patient was seen and examined. Date of last colonoscopy: 2014, Polyps  Yes      The airway was assessed and documented. The problem list, past medical history, and medications were reviewed. Patient Active Problem List   Diagnosis Code    Colon polyp K63.5    Essential hypertension with goal blood pressure less than 130/85 I10    Type 2 diabetes mellitus with hyperglycemia, without long-term current use of insulin (HCC) E11.65    Primary osteoarthritis of left shoulder M19.012    Gastroesophageal reflux disease without esophagitis K21.9    History of breast cancer Z85.3    Acquired hypothyroidism E03.9    Severe obesity (BMI 35.0-39. 9) with comorbidity ([facility] [address] E66.01    Mixed hyperlipidemia E78.2    Rectal bleeding K62.5     Social History     Socioeconomic History    Marital status:      Spouse name: Not on file    Number of children: Not on file    Years of education: Not on file    Highest education level: Bachelor's degree (e.g., BA, AB, BS)   Occupational History    Occupation: management     Comment: [employer]. Retired. Social Needs    Financial resource strain: Not hard at all   [email or web address] insecurity     Worry: Never true     Inability: Never true   [employer] needs     Medical: No     Non-medical: No   Tobacco Use    Smoking status: Never Smoker    Smokeless tobacco: Never Used   Substance and Sexual Activity    Alcohol use: No     Alcohol/week: 0.0 standard drinks     Frequency: Never     Drinks per session: Patient refused     Binge frequency: Never    Drug use: No    Sexual activity: Never   Lifestyle    Physical activity     Days per week: 0 days     Minutes per session: 0 min    Stress:  Only a little   Relationships    Social connections     Talks on phone: More than three times a week     Gets together: More than three times a week     Attends [employer] service: More than 4 times per year     Active member of club or organization: Yes     Attends meetings of clubs or organizations: More than 4 times per year     Relationship status:     Intimate partner violence     Fear of current or ex partner: No     Emotionally abused: No     Physically abused: No     Forced sexual activity: No   Other Topics Concern     Service No    Blood Transfusions No    Caffeine Concern No    Occupational Exposure No    Hobby Hazards No    Sleep Concern No    Stress Concern No    Weight Concern Yes    Special Diet Yes     Comment: diabetic    Back Care Yes    Exercise No    Bike Helmet No    Seat Belt Yes    Self-Exams Yes   Social History Narrative    Not on file     Past Medical History:   Diagnosis Date    Burning with urination     OVER ACTIVE BLADDER    Colon polyp 5/6/2016 2013, due for recheck 2018     Essential hypertension with goal blood pressure less than 130/85 5/6/2016    Gastroesophageal reflux disease without esophagitis 5/6/2016    No frazier's on EGD 2008     GERD (gastroesophageal reflux disease)     Malignant neoplasm of central portion of left female breast (Gabriela Ply) 5/6/2016    S/p lumpectomy 2004 at [facility], nodes clean. DCIS per pt. Had breast reduction at [place] [address] 2012. no chemo needed.  Pure hypercholesterolemia 5/6/2016    Seizures (Gabriela Ply)     Thyroid disease     Type 2 diabetes mellitus without complication (Gabriela Ply) 0/2/0016     The patient has a family history of colon ca    Prior to Admission Medications   Prescriptions Last Dose Informant Patient Reported? Taking? SYNTHROID 175 mcg tablet 7/6/2020 at Unknown time  No Yes   Sig: TAKE 1 TABLET BY MOUTH  DAILY BEFORE BREAKFAST   aspirin delayed-release 81 mg tablet 7/6/2020 at Unknown time  No Yes   Sig: Take 1 Tab by mouth daily.    atorvastatin (LIPITOR) 40 mg tablet 7/6/2020 at Unknown time  No Yes   Sig: TAKE 1 TABLET BY MOUTH  DAILY   canagliflozin-metformin 150-1,000 mg [facility] 7/6/2020 at Unknown time  No Yes   Sig: TAKE 2 TABLETS BY MOUTH  DAILY   exenatide microspheres (BYDUREON BCISE) 2 mg/0.85 mL atIn 7/6/2020 at Unknown time  No Yes   Sig: INJECT 2MG ONCE WEEKLY FOR  DIABETES   furosemide (LASIX) 40 mg tablet 7/6/2020 at Unknown time  No Yes   Sig: TAKE 1 TABLET BY MOUTH  DAILY   losartan (COZAAR) 100 mg tablet 7/6/2020 at Unknown time  No Yes   Sig: TAKE 1 TABLET BY MOUTH  DAILY. meloxicam (MOBIC) 7.5 mg tablet 7/6/2020 at Unknown time  No Yes   Sig: TAKE 1 TABLET BY MOUTH  DAILY   mirabegron ER (MYRBETRIQ) 50 mg ER tablet 7/6/2020 at Unknown time  Yes Yes   Sig: Take 50 mg by mouth daily. naproxen sodium (Aleve) 220 mg cap 7/6/2020 at Unknown time  Yes Yes   Sig: Take  by mouth. omeprazole (PRILOSEC) 40 mg capsule 6/30/2020 at Unknown time  Yes Yes   Sig: Take 40 mg by mouth daily. ondansetron (ZOFRAN ODT) 8 mg disintegrating tablet Unknown at Unknown time  No No   Sig: Take 1 Tab by mouth three (3) times daily as needed for Nausea. potassium chloride SR (KLOR-CON 10) 10 mEq tablet 7/6/2020 at Unknown time  No Yes   Sig: TAKE 1 TABLET BY MOUTH  DAILY   raNITIdine (ZANTAC) 150 mg tablet 7/5/2020  Yes No   Sig: Take 150 mg by mouth two (2) times a day. Facility-Administered Medications: None         The review of systems is:  negative for shortness of breath or chest pain      The heart, lungs and mental status were satisfactory for the administration of MAC sedation and for the procedure. Mallampati score: See Anesthesia. I discussed with the patient the objectives, risks, consequences and alternatives to the procedure. Plan: Endoscopic procedure with MAC sedation.     Gasper Delgadillo MD  7/7/2020  12:08 PM

## 2020-07-07 NOTE — ROUTINE PROCESS
Anand Kymberly  1955  [id number]    Situation:  Verbal report received from: Jason Bergeron RN  Procedure: Procedure(s):  ESOPHAGOGASTRODUODENOSCOPY (EGD)  COLONOSCOPY  ESOPHAGOGASTRODUODENAL (EGD) BIOPSY    Background:    Preoperative diagnosis: Abdominal pain  Postoperative diagnosis: 1. - Gastritis  2. - Irregular Z-line  3. - Universal Diverticulosis, Moderate Sigmoid Diverticulosis  4. - Small Internal Hemorrhoids    :  Dr. Km Alves  Assistant(s): Endoscopy Technician-1: Cyndi Byrne  Endoscopy RN-1: Teryl Denver, RN    Specimens:   ID Type Source Tests Collected by Time Destination   1 : [place] Biopsies Preservative   Kashmir Malave MD 7/7/2020 1222 Pathology     H. Pylori  yes    Assessment:  Intra-procedure medications   Anesthesia gave intra-procedure sedation and medications, see anesthesia flow sheet yes    Intravenous fluids: NS@ KVO     Vital signs stable     Abdominal assessment: round and soft     Recommendation:  Discharge patient per MD order.     Family or Friend   Permission to share finding with family or friend no

## 2020-07-07 NOTE — ANESTHESIA PREPROCEDURE EVALUATION
Relevant Problems   No relevant active problems       Anesthetic History   No history of anesthetic complications            Review of Systems / Medical History  Patient summary reviewed, nursing notes reviewed and pertinent labs reviewed    Pulmonary        Sleep apnea: CPAP           Neuro/Psych     seizures         Cardiovascular    Hypertension: well controlled              Exercise tolerance: >4 METS     GI/Hepatic/Renal     GERD           Endo/Other    Diabetes  Hypothyroidism  Obesity and arthritis     Other Findings            Physical Exam    Airway  Mallampati: I  TM Distance: 4 - 6 cm  Neck ROM: normal range of motion   Mouth opening: Normal     Cardiovascular    Rhythm: regular  Rate: normal         Dental  No notable dental hx       Pulmonary  Breath sounds clear to auscultation               Abdominal         Other Findings            Anesthetic Plan    ASA: 3  Anesthesia type: MAC          Induction: Intravenous  Anesthetic plan and risks discussed with: Patient

## 2020-07-09 LAB — SARS-COV-2, NAA: NOT DETECTED

## 2020-07-09 NOTE — PROGRESS NOTES
The patient was called for notification of a NEGATIVE test result for COVID-19. The following information was given to the patient:    The COVID-19 test, also known as novel coronavirus, result was negative  You probably were not infected at the time your sample was collected. However, that does not mean you will not get sick. The test result only means that you did not have COVID-19 at the time of testing. If you have no symptoms, continue to use preventive measures to protect yourself and others. If you have been sick, there are many other potential infectious causes. Contact your Primary Care Provider or Care Team for specific guidance, particularly if your symptoms worsen.   For more information visit the CDC website: DotProtection.gl

## 2020-07-13 NOTE — PROGRESS NOTES
Spoke with patient, confirmed with 2 identifiers, advised patient of negative Covid 19 results. Pt expressed understanding.  KT

## 2021-07-20 DIAGNOSIS — M19.012 PRIMARY OSTEOARTHRITIS OF LEFT SHOULDER: ICD-10-CM

## 2021-07-21 RX ORDER — MELOXICAM 7.5 MG/1
TABLET ORAL
Qty: 90 TABLET | Refills: 3 | OUTPATIENT
Start: 2021-07-21

## 2021-09-23 DIAGNOSIS — E03.9 ACQUIRED HYPOTHYROIDISM: ICD-10-CM

## 2021-09-23 DIAGNOSIS — I10 ESSENTIAL HYPERTENSION WITH GOAL BLOOD PRESSURE LESS THAN 130/85: ICD-10-CM

## 2021-09-23 RX ORDER — FUROSEMIDE 40 MG/1
40 TABLET ORAL DAILY
Qty: 90 TABLET | Refills: 0 | Status: SHIPPED | OUTPATIENT
Start: 2021-09-23 | End: 2022-01-03 | Stop reason: SDUPTHER

## 2021-09-23 RX ORDER — POTASSIUM CHLORIDE 750 MG/1
10 TABLET, FILM COATED, EXTENDED RELEASE ORAL DAILY
Qty: 90 TABLET | Refills: 0 | Status: SHIPPED | OUTPATIENT
Start: 2021-09-23 | End: 2022-01-03 | Stop reason: SDUPTHER

## 2021-09-23 RX ORDER — LEVOTHYROXINE SODIUM 200 UG/1
200 TABLET ORAL
Qty: 90 TABLET | Refills: 0 | Status: SHIPPED | OUTPATIENT
Start: 2021-09-23 | End: 2022-01-03 | Stop reason: SDUPTHER

## 2021-12-16 DIAGNOSIS — I10 ESSENTIAL HYPERTENSION WITH GOAL BLOOD PRESSURE LESS THAN 130/85: ICD-10-CM

## 2021-12-16 DIAGNOSIS — E11.9 TYPE 2 DIABETES MELLITUS WITHOUT COMPLICATION, WITHOUT LONG-TERM CURRENT USE OF INSULIN (HCC): ICD-10-CM

## 2021-12-16 DIAGNOSIS — E03.9 ACQUIRED HYPOTHYROIDISM: ICD-10-CM

## 2021-12-29 RX ORDER — LEVOTHYROXINE SODIUM 200 UG/1
TABLET ORAL
Qty: 90 TABLET | Refills: 3 | OUTPATIENT
Start: 2021-12-29

## 2021-12-29 RX ORDER — POTASSIUM CHLORIDE 750 MG/1
TABLET, FILM COATED, EXTENDED RELEASE ORAL
Qty: 90 TABLET | Refills: 3 | OUTPATIENT
Start: 2021-12-29

## 2021-12-29 RX ORDER — FUROSEMIDE 40 MG/1
TABLET ORAL
Qty: 90 TABLET | Refills: 3 | OUTPATIENT
Start: 2021-12-29

## 2022-01-03 RX ORDER — LEVOTHYROXINE SODIUM 200 UG/1
200 TABLET ORAL
Qty: 15 TABLET | Refills: 0 | Status: SHIPPED | OUTPATIENT
Start: 2022-01-03 | End: 2022-01-11 | Stop reason: SDUPTHER

## 2022-01-03 RX ORDER — POTASSIUM CHLORIDE 750 MG/1
10 TABLET, FILM COATED, EXTENDED RELEASE ORAL DAILY
Qty: 15 TABLET | Refills: 0 | Status: SHIPPED | OUTPATIENT
Start: 2022-01-03 | End: 2022-01-11 | Stop reason: SDUPTHER

## 2022-01-03 RX ORDER — FUROSEMIDE 40 MG/1
40 TABLET ORAL DAILY
Qty: 15 TABLET | Refills: 0 | Status: SHIPPED | OUTPATIENT
Start: 2022-01-03 | End: 2022-01-11 | Stop reason: SDUPTHER

## 2022-01-04 ENCOUNTER — TELEPHONE (OUTPATIENT)
Dept: FAMILY MEDICINE CLINIC | Age: 67
End: 2022-01-04

## 2022-01-04 NOTE — TELEPHONE ENCOUNTER
Wants to speak with Dr Torres Batista. Believes that the Vit B supplement is causing her to have issues with her urine. Color and odor.

## 2022-01-04 NOTE — TELEPHONE ENCOUNTER
met with patient to discuss discharge and aftercare plans. See AVS for further details.    Outpatient Providers: sees Dr Monzon who rx all meds. Pt interested in individual therapist  Living Arrangements: address on facesheet with family  Transportation: drive, walk, mtm eligible  Legal Status: vol  Medication Issues:denies, uses slss pharamcy   Community Resources: denies  Support System: parents  Source of Income/Payee:ssd       Patient calling in for another patient. Question not for this chart.

## 2022-01-11 ENCOUNTER — OFFICE VISIT (OUTPATIENT)
Dept: FAMILY MEDICINE CLINIC | Age: 67
End: 2022-01-11
Payer: MEDICARE

## 2022-01-11 VITALS
BODY MASS INDEX: 39.95 KG/M2 | SYSTOLIC BLOOD PRESSURE: 146 MMHG | RESPIRATION RATE: 16 BRPM | HEART RATE: 90 BPM | DIASTOLIC BLOOD PRESSURE: 84 MMHG | HEIGHT: 64 IN | WEIGHT: 234 LBS | TEMPERATURE: 97.9 F | OXYGEN SATURATION: 98 %

## 2022-01-11 DIAGNOSIS — E03.9 ACQUIRED HYPOTHYROIDISM: ICD-10-CM

## 2022-01-11 DIAGNOSIS — Z00.00 MEDICARE ANNUAL WELLNESS VISIT, SUBSEQUENT: Primary | ICD-10-CM

## 2022-01-11 DIAGNOSIS — E11.9 TYPE 2 DIABETES MELLITUS WITHOUT COMPLICATION, WITHOUT LONG-TERM CURRENT USE OF INSULIN (HCC): ICD-10-CM

## 2022-01-11 DIAGNOSIS — Z13.31 SCREENING FOR DEPRESSION: ICD-10-CM

## 2022-01-11 DIAGNOSIS — Z13.39 SCREENING FOR ALCOHOLISM: ICD-10-CM

## 2022-01-11 DIAGNOSIS — E66.01 SEVERE OBESITY (BMI 35.0-39.9) WITH COMORBIDITY (HCC): ICD-10-CM

## 2022-01-11 DIAGNOSIS — E78.00 PURE HYPERCHOLESTEROLEMIA: ICD-10-CM

## 2022-01-11 DIAGNOSIS — I10 ESSENTIAL HYPERTENSION WITH GOAL BLOOD PRESSURE LESS THAN 130/85: ICD-10-CM

## 2022-01-11 DIAGNOSIS — E11.65 TYPE 2 DIABETES MELLITUS WITH HYPERGLYCEMIA, WITHOUT LONG-TERM CURRENT USE OF INSULIN (HCC): ICD-10-CM

## 2022-01-11 DIAGNOSIS — C50.112 MALIGNANT NEOPLASM OF CENTRAL PORTION OF LEFT FEMALE BREAST, UNSPECIFIED ESTROGEN RECEPTOR STATUS (HCC): ICD-10-CM

## 2022-01-11 DIAGNOSIS — Z23 ENCOUNTER FOR IMMUNIZATION: ICD-10-CM

## 2022-01-11 DIAGNOSIS — K21.9 GASTROESOPHAGEAL REFLUX DISEASE WITHOUT ESOPHAGITIS: ICD-10-CM

## 2022-01-11 DIAGNOSIS — M19.012 PRIMARY OSTEOARTHRITIS OF LEFT SHOULDER: ICD-10-CM

## 2022-01-11 PROCEDURE — G0439 PPPS, SUBSEQ VISIT: HCPCS | Performed by: FAMILY MEDICINE

## 2022-01-11 PROCEDURE — 90746 HEPB VACCINE 3 DOSE ADULT IM: CPT | Performed by: FAMILY MEDICINE

## 2022-01-11 PROCEDURE — 99214 OFFICE O/P EST MOD 30 MIN: CPT | Performed by: FAMILY MEDICINE

## 2022-01-11 PROCEDURE — 90471 IMMUNIZATION ADMIN: CPT | Performed by: FAMILY MEDICINE

## 2022-01-11 PROCEDURE — G0444 DEPRESSION SCREEN ANNUAL: HCPCS | Performed by: FAMILY MEDICINE

## 2022-01-11 PROCEDURE — 90632 HEPA VACCINE ADULT IM: CPT | Performed by: FAMILY MEDICINE

## 2022-01-11 PROCEDURE — G0010 ADMIN HEPATITIS B VACCINE: HCPCS | Performed by: FAMILY MEDICINE

## 2022-01-11 RX ORDER — LEVOTHYROXINE SODIUM 200 UG/1
200 TABLET ORAL
Qty: 90 TABLET | Refills: 3 | Status: SHIPPED | OUTPATIENT
Start: 2022-01-11

## 2022-01-11 RX ORDER — POTASSIUM CHLORIDE 750 MG/1
10 TABLET, FILM COATED, EXTENDED RELEASE ORAL DAILY
Qty: 90 TABLET | Refills: 3 | Status: SHIPPED | OUTPATIENT
Start: 2022-01-11

## 2022-01-11 RX ORDER — MELOXICAM 7.5 MG/1
7.5 TABLET ORAL DAILY
Qty: 90 TABLET | Refills: 3 | Status: SHIPPED | OUTPATIENT
Start: 2022-01-11

## 2022-01-11 RX ORDER — LOSARTAN POTASSIUM 100 MG/1
100 TABLET ORAL DAILY
Qty: 90 TABLET | Refills: 3 | Status: SHIPPED | OUTPATIENT
Start: 2022-01-11

## 2022-01-11 RX ORDER — ATORVASTATIN CALCIUM 40 MG/1
40 TABLET, FILM COATED ORAL DAILY
Qty: 90 TABLET | Refills: 3 | Status: SHIPPED | OUTPATIENT
Start: 2022-01-11

## 2022-01-11 RX ORDER — FUROSEMIDE 40 MG/1
40 TABLET ORAL DAILY
Qty: 90 TABLET | Refills: 3 | Status: SHIPPED | OUTPATIENT
Start: 2022-01-11

## 2022-01-11 RX ORDER — EXENATIDE 2 MG/.85ML
INJECTION, SUSPENSION, EXTENDED RELEASE SUBCUTANEOUS
Qty: 12 EACH | Refills: 3 | Status: SHIPPED | OUTPATIENT
Start: 2022-01-11

## 2022-01-11 RX ORDER — OMEPRAZOLE 40 MG/1
40 CAPSULE, DELAYED RELEASE ORAL DAILY
Qty: 90 CAPSULE | Refills: 3 | Status: SHIPPED | OUTPATIENT
Start: 2022-01-11

## 2022-01-11 NOTE — PROGRESS NOTES
After obtaining consent, and per orders of Dr. Veronique Mccarty, injection o Hep A and Hep B  given by Ju Del Rosario LPN. Patient instructed to remain in clinic for 20 minutes afterwards, and to report any adverse reaction to me immediately.

## 2022-01-11 NOTE — PROGRESS NOTES
Chief Complaint   Patient presents with    Hypertension     1. Have you been to the ER, urgent care clinic since your last visit? Hospitalized since your last visit? No  2. Have you seen or consulted any other health care providers outside of the [address] [place] [address] since your last visit? Include any pap smears or colon screening. Nati JIMENEZ Ady Garner is a 73 y.o. female     HPI:  DM2  Doing well from sx standpoint since last visit, but has gotten off track and stopped most of her meds. Due for renewal. PRev on invokamet XR to 2x 150/1000mg pills daily,  bydureon once a week. Lab Results   Component Value Date/Time    Hemoglobin A1c 10.3 (H) 09/09/2020 02:35 PM    Hemoglobin A1c 10.9 (H) 06/10/2020 02:17 PM    Hemoglobin A1c 9.2 (H) 02/14/2019 02:18 PM    Glucose 348 (H) 12/31/2020 02:33 PM    Glucose (POC) 153 (H) 07/07/2020 11:02 AM    Microalb/Creat ratio (ug/mg creat.) 23 06/10/2020 02:17 PM    LDL, calculated 109 (H) 06/10/2020 02:17 PM    Creatinine 1.25 (H) 12/31/2020 02:33 PM     Last foot check good 2/2018  Last eye check ok with Dr. Saeed Hidden Fall 2016. Hypertension. Blood pressures close to goal today. Management at last visit included continuing current regimen, but has been off meds lately. Current regimen: angiotensin II receptor antagonist and loop diuretic. Symptoms include no sx, Patient denies chest pain, palpitations.   Lab review:   Lab Results   Component Value Date/Time    Sodium 134 (L) 12/31/2020 02:33 PM    Potassium 3.8 12/31/2020 02:33 PM    Chloride 96 (L) 12/31/2020 02:33 PM    CO2 27 12/31/2020 02:33 PM    Anion gap 11 12/31/2020 02:33 PM    Glucose 348 (H) 12/31/2020 02:33 PM    BUN 15 12/31/2020 02:33 PM    Creatinine 1.25 (H) 12/31/2020 02:33 PM    BUN/Creatinine ratio 12 12/31/2020 02:33 PM    GFR est AA 52 (L) 12/31/2020 02:33 PM    GFR est non-AA 43 (L) 12/31/2020 02:33 PM    Calcium 9.0 12/31/2020 02:33 PM     Hypothyroid  Lab Results   Component Value Date/Time TSH 74.100 (H) 09/09/2020 02:35 PM    TSH 92.290 (H) 06/10/2020 02:17 PM   A little undertreated on last check on synthroid to 200mcg/day, but not taking it regularly. Due for recheck. Hyperlipidemia. Prev on lipitor 40, needs refill. Apryl well. No myalgias, arthralgias, unusual weakness. Lab Results   Component Value Date/Time    Cholesterol, total 193 06/10/2020 02:17 PM    HDL Cholesterol 57 06/10/2020 02:17 PM    LDL, calculated 109 (H) 06/10/2020 02:17 PM    VLDL, calculated 27 06/10/2020 02:17 PM    Triglyceride 136 06/10/2020 02:17 PM     Lab Results   Component Value Date/Time    ALT (SGPT) 26 12/31/2020 02:33 PM    Alk. phosphatase 71 12/31/2020 02:33 PM    Bilirubin, total 0.4 12/31/2020 02:33 PM     PMH, SH, Medications/Allergies: reviewed, on chart. ROS:  Constitutional: No fever, chills or weight loss  Respiratory: No cough, SOB   CV: No chest pain or Palpitations    Visit Vitals  BP (!) 146/84 (BP 1 Location: Right arm)   Pulse 90   Temp 97.9 °F (36.6 °C) (Temporal)   Resp 16   Ht 5' 4\" (1.626 m)   Wt 234 lb (106.1 kg)   SpO2 98%   BMI 40.17 kg/m²     Wt Readings from Last 3 Encounters:   01/11/22 234 lb (106.1 kg)   12/31/20 241 lb (109.3 kg)   07/07/20 232 lb (105.2 kg)   -7#  BP Readings from Last 3 Encounters:   01/11/22 (!) 146/84   12/31/20 (!) 151/92   07/07/20 127/86     Physical Examination: General appearance - alert, well appearing, and in no distress  Mental status - alert, oriented to person, place, and time  Eyes - pupils equal and reactive, extraocular eye movements intact  ENT - bilateral external ears and nose normal. Normal lips  Neck - supple, no significant adenopathy, no thyromegaly or mass.   Lymphatics - no palpable lymphadenopathy, no hepatosplenomegaly  Chest - clear to auscultation, no wheezes, rales or rhonchi, symmetric air entry  Heart - normal rate, regular rhythm, normal S1, S2, no murmurs, rubs, clicks or gallops  Extremities - peripheral pulses normal, no pedal edema, no clubbing or cyanosis. A/P:  DM2  Feeling good. Keep working on losing weight. Continue invokamet xr 1000/150 1 BID. Con't bydureon qwk. Recheck A1c, adjust PRN. Still thinking about bariatrics. HTN and Hx Edema  W/C. Edema minimal. Ok to con't lasix at 40mg daily. Hypothyroid  Overtreated on last check, hasn't cut, will see today if to goal yet on 200mcg daily, if still suppressed, try again to change to 175's. HLD  well controlled. con't current tx for now, Check labs, adjust PRN. Br Ca  mammo pending 2/28 at [facility]. Overweight. Stable, keep working on carb control. F/U 3mo/PRN  ______________________________________________________________________    Maxime Olmos is a 73 y.o. female and presents for annual Medicare Wellness Visit. Problem List: Reviewed with patient and discussed risk factors. Patient Active Problem List   Diagnosis Code    Colon polyp K63.5    Essential hypertension with goal blood pressure less than 130/85 I10    Type 2 diabetes mellitus with hyperglycemia, without long-term current use of insulin (HCC) E11.65    Primary osteoarthritis of left shoulder M19.012    Gastroesophageal reflux disease without esophagitis K21.9    History of breast cancer Z85.3    Acquired hypothyroidism E03.9    Severe obesity (BMI 35.0-39. 9) with comorbidity ([facility] [address] E66.01    Mixed hyperlipidemia E78.2    Rectal bleeding K62.5     1. Have you been to the ER, urgent care clinic since your last visit? Hospitalized since your last visit? No  2. Have you seen or consulted any other health care providers outside of the [address] [place] [address] since your last visit? Include any pap smears or colon screening.  No    Current medical providers:  Patient Care Team:  Honey Gomez MD as PCP - General (Family Medicine)  Honey Gomez MD as PCP - [facility] Empaneled Provider  Jose C Lopez MD (General Surgery)    [facility], [facility], Medications/Allergies: reviewed, on chart. Female Alcohol Screening: On any occasion during the past 3 months, have you had more than 3 drinks containing alcohol? No  Do you average more than 7 drinks per week? No    ROS:  Constitutional: No fever, chills or weight loss  Respiratory: No cough, SOB   CV: No chest pain or Palpitations    Objective:  Visit Vitals  BP (!) 146/84 (BP 1 Location: Right arm)   Pulse 90   Temp 97.9 °F (36.6 °C) (Temporal)   Resp 16   Ht 5' 4\" (1.626 m)   Wt 234 lb (106.1 kg)   SpO2 98%   BMI 40.17 kg/m²    Body mass index is 40.17 kg/m². Assessment of cognitive impairment: Alert and oriented x 3  Mini-co Clock, 2/3 recall    Depression Screen:   3 most recent PHQ Screens 2022   PHQ Not Done -   Little interest or pleasure in doing things Not at all   Feeling down, depressed, irritable, or hopeless Not at all   Total Score PHQ 2 0     Depression screening performed and reviewed with patient for 8-15 minutes    Fall Risk Assessment:    Fall Risk Assessment, last 12 mths 2022   Able to walk? Yes   Fall in past 12 months? 0   Do you feel unsteady? 0   Are you worried about falling 0   Number of falls in past 12 months -   Fall with injury? -       Functional Ability:   Does the patient exhibit a steady gait? yes   How long did it take the patient to get up and walk from a sitting position? na   Is the patient self reliant?  (ie can do own laundry, meals, household chores)  yes     Does the patient handle his/her own medications? yes     Does the patient handle his/her own money? yes     Is the patients home safe (ie good lighting, handrails on stairs and bath, etc.)? yes     Did you notice or did patient express any hearing difficulties? no     Did you notice or did patient express any vision difficulties?    no       Advance Care Planning:   Patient was offered the opportunity to discuss advance care planning:  yes     Does patient have an Advance Directive:  no   If no, did you provide information on Caring Connections? yes     Plan:      Hx [facility] ca  [facility] [place] [facility], pt plans to arrange followup on her own. Orders Placed This Encounter    Depression Screen Annual    Hepatitis A vaccine, adult dosage, IM    Hepatitis B vaccine, adult dosage, IM    HEMOGLOBIN A1C WITH EAG    METABOLIC PANEL, COMPREHENSIVE    LIPID PANEL    MICROALBUMIN, UR, RAND W/ MICROALB/CREAT RATIO    TSH 3RD GENERATION    atorvastatin (LIPITOR) 40 mg tablet    levothyroxine (SYNTHROID) 200 mcg tablet    potassium chloride SR (KLOR-CON 10) 10 mEq tablet    furosemide (LASIX) 40 mg tablet    canagliflozin-metformin 150-1,000 mg TBph    losartan (COZAAR) 100 mg tablet    meloxicam (MOBIC) 7.5 mg tablet    omeprazole (PRILOSEC) 40 mg capsule    exenatide microspheres (Bydureon BCise) 2 mg/0.85 mL atIn     Health Maintenance   Topic Date Due    Breast Cancer Screen Mammogram  2019    Eye Exam Retinal or Dilated  2020    Shingrix Vaccine Age 50> (2 of 2) 2020    Bone Densitometry (Dexa) Screening  Never done    Foot Exam Q1  06/10/2021    MICROALBUMIN Q1  06/10/2021    Lipid Screen  06/10/2021    COVID-19 Vaccine (3 - Booster for Moderna series) 2021    Flu Vaccine (1) 2021    A1C test (Diabetic or Prediabetic)  2021    Medicare Yearly Exam  2023    Cervical cancer screen  2023    Pneumococcal 65+ years (2 of 2 - PPSV23) 06/10/2025    DTaP/Tdap/Td series (2 - Td or Tdap) 2028    Colorectal Cancer Screening Combo  2030    Hepatitis C Screening  Completed     *Patient verbalized understanding and agreement with the plan. A copy of the After Visit Summary with personalized health plan was given to the patient today. Patient advised that that will be a visit charge for services in addition to [employer]     Identified pt with two pt identifiers(name and ).  Reviewed record in preparation for visit and have obtained necessary documentation.     Symptom review:    NO  Fever   NO  Shaking chills  NO  Cough  NO Headaches  NO  Body aches  NO  Coughing up blood  NO  Chest congestion  NO  Chest pain  NO  Shortness of breath  NO  Profound Loss of smell/taste  NO  Nausea/Vomiting   NO  Loose stool/Diarrhea  NO  any skin issues

## 2022-01-12 LAB
ALBUMIN SERPL-MCNC: 4.3 G/DL (ref 3.5–5)
ALBUMIN/GLOB SERPL: 1.2 {RATIO} (ref 1.1–2.2)
ALP SERPL-CCNC: 70 U/L (ref 45–117)
ALT SERPL-CCNC: 25 U/L (ref 12–78)
ANION GAP SERPL CALC-SCNC: 6 MMOL/L (ref 5–15)
AST SERPL-CCNC: 17 U/L (ref 15–37)
BILIRUB SERPL-MCNC: 0.4 MG/DL (ref 0.2–1)
BUN SERPL-MCNC: 13 MG/DL (ref 6–20)
BUN/CREAT SERPL: 12 (ref 12–20)
CALCIUM SERPL-MCNC: 9.5 MG/DL (ref 8.5–10.1)
CHLORIDE SERPL-SCNC: 99 MMOL/L (ref 97–108)
CHOLEST SERPL-MCNC: 424 MG/DL
CO2 SERPL-SCNC: 30 MMOL/L (ref 21–32)
CREAT SERPL-MCNC: 1.07 MG/DL (ref 0.55–1.02)
CREAT UR-MCNC: 126 MG/DL
EST. AVERAGE GLUCOSE BLD GHB EST-MCNC: 309 MG/DL
GLOBULIN SER CALC-MCNC: 3.7 G/DL (ref 2–4)
GLUCOSE SERPL-MCNC: 344 MG/DL (ref 65–100)
HBA1C MFR BLD: 12.4 % (ref 4–5.6)
HDLC SERPL-MCNC: 86 MG/DL
HDLC SERPL: 4.9 {RATIO} (ref 0–5)
LDLC SERPL CALC-MCNC: 305.6 MG/DL (ref 0–100)
MICROALBUMIN UR-MCNC: 43.3 MG/DL
MICROALBUMIN/CREAT UR-RTO: 344 MG/G (ref 0–30)
POTASSIUM SERPL-SCNC: 3.7 MMOL/L (ref 3.5–5.1)
PROT SERPL-MCNC: 8 G/DL (ref 6.4–8.2)
SODIUM SERPL-SCNC: 135 MMOL/L (ref 136–145)
TRIGL SERPL-MCNC: 162 MG/DL (ref ?–150)
TSH SERPL DL<=0.05 MIU/L-ACNC: 75.3 UIU/ML (ref 0.36–3.74)
VLDLC SERPL CALC-MCNC: 32.4 MG/DL

## 2022-01-21 NOTE — PROGRESS NOTES
Labs pretty abnormal, mostly because of running out of the meds. Should see a nice improvement on next check.

## 2022-03-18 PROBLEM — E78.2 MIXED HYPERLIPIDEMIA: Status: ACTIVE | Noted: 2020-06-10

## 2022-03-18 PROBLEM — E66.01 SEVERE OBESITY (BMI 35.0-39.9) WITH COMORBIDITY (HCC): Status: ACTIVE | Noted: 2018-05-22

## 2022-03-18 PROBLEM — K62.5 RECTAL BLEEDING: Status: ACTIVE | Noted: 2020-06-10

## 2022-04-01 ENCOUNTER — OFFICE VISIT (OUTPATIENT)
Dept: FAMILY MEDICINE CLINIC | Age: 67
End: 2022-04-01
Payer: MEDICARE

## 2022-04-01 ENCOUNTER — TELEPHONE (OUTPATIENT)
Dept: FAMILY MEDICINE CLINIC | Age: 67
End: 2022-04-01

## 2022-04-01 DIAGNOSIS — Z23 ENCOUNTER FOR IMMUNIZATION: Primary | ICD-10-CM

## 2022-04-01 PROCEDURE — G0010 ADMIN HEPATITIS B VACCINE: HCPCS | Performed by: FAMILY MEDICINE

## 2022-04-01 PROCEDURE — 90746 HEPB VACCINE 3 DOSE ADULT IM: CPT | Performed by: FAMILY MEDICINE

## 2022-04-01 NOTE — PROGRESS NOTES
After obtaining consent, and per orders of Dr. Shiraz Perez, injection of Hep B to (L) deltoid given by Betsey Dexter. Patient instructed to remain in clinic for 20 minutes afterwards, and to report any adverse reaction to me immediately.

## 2022-04-01 NOTE — TELEPHONE ENCOUNTER
sandi in office today for #2 Hep B vaccine. Is concerned because #3 is due in 6 months (October) but she will be leaving for [place] on Sept 23. Wants to know if / when she can get her 3rd dosage.  Requests PCP to call her at [phone]

## 2022-04-01 NOTE — PATIENT INSTRUCTIONS
Vaccine Information Statement    Hepatitis B Vaccine: What You Need to Know    Many Vaccine Information Statements are available in [place] and other languages. See www.immunize.org/vis  Hojas de información sobre vacunas están disponibles en español y en muchos otros idiomas. Visite www.immunize.org/vis    1. Why get vaccinated? Hepatitis B vaccine can prevent hepatitis B. Hepatitis B is a liver disease that can cause mild illness lasting a few weeks, or it can lead to a serious, lifelong illness.  Acute hepatitis B infection is a short-term illness that can lead to fever, fatigue, loss of appetite, nausea, vomiting, jaundice (yellow skin or eyes, dark urine, héctor-colored bowel movements), and pain in the muscles, joints, and stomach.  Chronic hepatitis B infection is a long-term illness that occurs when the hepatitis B virus remains in a persons body. Most people who go on to develop chronic hepatitis B do not have symptoms, but it is still very serious and can lead to liver damage (cirrhosis), liver cancer, and death. Chronically-infected people can spread hepatitis B virus to others, even if they do not feel or look sick themselves. Hepatitis B is spread when blood, semen, or other body fluid infected with the hepatitis B virus enters the body of a person who is not infected. People can become infected through:  [employer] (if a mother has hepatitis B, her baby can become infected)   Sharing items such as razors or toothbrushes with an infected person   Contact with the blood or open sores of an infected person   Sex with an infected partner   Sharing needles, syringes, or other drug-injection equipment   Exposure to blood from needlesticks or other sharp instruments    Most people who are vaccinated with hepatitis B vaccine are immune for life. 2. Hepatitis B vaccine    Hepatitis B vaccine is usually given as 2, 3, or 4 shots.     Infants should get their first dose of hepatitis B vaccine at birth and will usually complete the series at 7 months of age (sometimes it will take longer than 6 months to complete the series). Children and adolescents younger than 23years of age who have not yet gotten the vaccine should also be vaccinated. Hepatitis B vaccine is also recommended for certain unvaccinated adults:     People whose sex partners have hepatitis B   Sexually active persons who are not in a long-term monogamous relationship   Persons seeking evaluation or treatment for a sexually transmitted disease   Men who have sexual contact with other men   People who share needles, syringes, or other drug-injection equipment   People who have household contact with someone infected with the hepatitis B virus  [facility] care and public safety workers at risk for exposure to blood or body fluids   Residents and staff of facilities for developmentally disabled persons   Persons in correctional facilities   Victims of sexual assault or abuse   Travelers to regions with increased rates of hepatitis B   People with chronic liver disease, kidney disease, HIV infection, infection with hepatitis C, or diabetes   Anyone who wants to be protected from hepatitis B    Hepatitis B vaccine may be given at the same time as other vaccines. 3. Talk with your health care provider    Tell your vaccine provider if the person getting the vaccine:   Has had an allergic reaction after a previous dose of hepatitis B vaccine, or has any severe, life-threatening allergies. In some cases, your health care provider may decide to postpone hepatitis B vaccination to a future visit. People with minor illnesses, such as a cold, may be vaccinated. People who are moderately or severely ill should usually wait until they recover before getting hepatitis B vaccine. Your health care provider can give you more information.     4. Risks of a vaccine reaction     Soreness where the shot is given or fever can happen after hepatitis B vaccine. People sometimes faint after medical procedures, including vaccination. Tell your provider if you feel dizzy or have vision changes or ringing in the ears. As with any medicine, there is a very remote chance of a vaccine causing a severe allergic reaction, other serious injury, or death. 5. What if there is a serious problem? An allergic reaction could occur after the vaccinated person leaves the clinic. If you see signs of a severe allergic reaction (hives, swelling of the face and throat, difficulty breathing, a fast heartbeat, dizziness, or weakness), call 9-1-1 and get the person to the nearest hospital.    For other signs that concern you, call your health care provider. Adverse reactions should be reported to the Vaccine Adverse Event Reporting System (VAERS). Your health care provider will usually file this report, or you can do it yourself. Visit the VAERS website at www.vaers. hhs.gov or call 5-459.875.8141. VAERS is only for reporting reactions, and VAERS staff do not give medical advice. 6. The National Vaccine Injury Compensation Program    The [facility] Vaccine Injury Compensation Program (VICP) is a federal program that was created to compensate people who may have been injured by certain vaccines. Visit the VICP website at www.hrsa.gov/vaccinecompensation or call 2-183.978.4662 to learn about the program and about filing a claim. There is a time limit to file a claim for compensation. 7. How can I learn more?  Ask your health care provider.  Call your local or state health department.  Contact the Centers for Disease Control and Prevention (CDC):  - Call 7-812.755.8448 (1-800-CDC-INFO) or  - Visit CDCs website at www.cdc.gov/vaccines    Vaccine Information Statement (Interim)  Hepatitis B Vaccine   8/15/2019  [address] [id number]   Department of Health and Human Services  Centers for Disease Control and Prevention    Office Use Only

## 2022-06-28 ENCOUNTER — PATIENT MESSAGE (OUTPATIENT)
Dept: FAMILY MEDICINE CLINIC | Age: 67
End: 2022-06-28

## 2022-07-12 ENCOUNTER — OFFICE VISIT (OUTPATIENT)
Dept: FAMILY MEDICINE CLINIC | Age: 67
End: 2022-07-12
Payer: MEDICARE

## 2022-07-12 DIAGNOSIS — Z23 ENCOUNTER FOR IMMUNIZATION: Primary | ICD-10-CM

## 2022-07-12 PROCEDURE — 90632 HEPA VACCINE ADULT IM: CPT | Performed by: FAMILY MEDICINE

## 2022-07-12 PROCEDURE — 90471 IMMUNIZATION ADMIN: CPT | Performed by: FAMILY MEDICINE

## 2022-07-12 NOTE — PROGRESS NOTES
After obtaining consent, and per orders of Dr. Deirdre Corona, injection of hep A to (L) deltoid given by Edvin Willingham. Patient instructed to remain in clinic for 20 minutes afterwards, and to report any adverse reaction to me immediately.

## 2022-07-12 NOTE — PATIENT INSTRUCTIONS
Vaccine Information Statement    Hepatitis A Vaccine: What You Need to Know    Many vaccine information statements are available in [place] and other languages. See www.immunize.org/vis. Hojas de información sobre vacunas están disponibles en español y en muchos otros idiomas. Visite www.immunize.org/vis. 1. Why get vaccinated? Hepatitis A vaccine can prevent hepatitis A. Hepatitis A is a serious liver disease. It is usually spread through close, personal contact with an infected person or when a person unknowingly ingests the virus from objects, food, or drinks that are contaminated by small amounts of stool (poop) from an infected person. Most adults with hepatitis A have symptoms, including fatigue, low appetite, stomach pain, nausea, and jaundice (yellow skin or eyes, dark urine, light-colored bowel movements). Most children less than 10years of age do not have symptoms. A person infected with hepatitis A can transmit the disease to other people even if he or she does not have any symptoms of the disease. Most people who get hepatitis A feel sick for several weeks, but they usually recover completely and do not have lasting liver damage. In rare cases, hepatitis A can cause liver failure and death; this is more common in people older than 48 years and in people with other liver diseases. Hepatitis A vaccine has made this disease much less common in the [place]. However, outbreaks of hepatitis A among unvaccinated people still happen. 2. Hepatitis A vaccine    Children need 2 doses of hepatitis A vaccine:   First dose: 12 through 21months of age   [facility] Second dose: at least 6 months after the first dose     Infants 6 through 8 months old traveling outside the [place] when protection against hepatitis A is recommended should receive 1 dose of hepatitis A vaccine. These children should still get 2 additional doses at the recommended ages for long-lasting protection.     Older children and adolescents 2 through 25years of age who were not vaccinated previously should be vaccinated. Adults who were not vaccinated previously and want to be protected against hepatitis A can also get the vaccine. Hepatitis A vaccine is also recommended for the following people:   International travelers   Men who have sexual contact with other men   People who use injection or non-injection drugs   People who have occupational risk for infection   People who anticipate close contact with an international adoptee   People experiencing homelessness   People with HIV   People with chronic liver disease    In addition, a person who has not previously received hepatitis A vaccine and who has direct contact with someone with hepatitis A should get hepatitis A vaccine as soon as possible and within 2 weeks after exposure. Hepatitis A vaccine may be given at the same time as other vaccines. 3. Talk with your health care provider    Tell your vaccination provider if the person getting the vaccine:   Has had an allergic reaction after a previous dose of hepatitis A vaccine, or has any severe, life-threatening allergies     In some cases, your health care provider may decide to postpone hepatitis A vaccination until a future visit. Pregnant or breastfeeding people should be vaccinated if they are at risk for getting hepatitis A. Pregnancy or breastfeeding are not reasons to avoid hepatitis A vaccination. People with minor illnesses, such as a cold, may be vaccinated. People who are moderately or severely ill should usually wait until they recover before getting hepatitis A vaccine. Your health care provider can give you more information. 4. Risks of a vaccine reaction     Soreness or redness where the shot is given, fever, headache, tiredness, or loss of appetite can happen after hepatitis A vaccination. People sometimes faint after medical procedures, including vaccination. Tell your provider if you feel dizzy or have vision changes or ringing in the ears. As with any medicine, there is a very remote chance of a vaccine causing a severe allergic reaction, other serious injury, or death. 5. What if there is a serious problem? An allergic reaction could occur after the vaccinated person leaves the clinic. If you see signs of a severe allergic reaction (hives, swelling of the face and throat, difficulty breathing, a fast heartbeat, dizziness, or weakness), call 9-1-1 and get the person to the nearest hospital.    For other signs that concern you, call your health care provider. Adverse reactions should be reported to the Vaccine Adverse Event Reporting System (VAERS). Your health care provider will usually file this report, or you can do it yourself. Visit the VAERS website at www.vaers. hhs.gov or call 8-806.655.8084. VAERS is only for reporting reactions, and VAERS staff members do not give medical advice. 6. The National Vaccine Injury Compensation Program    The [facility] Vaccine Injury Compensation Program (VICP) is a federal program that was created to compensate people who may have been injured by certain vaccines. Claims regarding alleged injury or death due to vaccination have a time limit for filing, which may be as short as two years. Visit the VICP website at www.[facility].gov/vaccinecompensation or call 7-958.981.1514 to learn about the program and about filing a claim. 7. How can I learn more?  Ask your health care provider.  Call your local or state health department.  Visit the website of the Food and Drug Administration (FDA) for vaccine package inserts and additional information at www.fda.gov/vaccines-blood-biologics/vaccines.  Contact the Centers for Disease Control and Prevention (CDC):  - Call 8-615.474.1255 (1-800-CDC-INFO) or  - Visit CDCs website at www.cdc.gov/vaccines.     Vaccine Information Statement   Hepatitis A Vaccine   10/15/2021  42 [place]  § [id number]   [facility] Disease Control and Prevention    Office Use Only

## 2022-09-06 ENCOUNTER — TELEPHONE (OUTPATIENT)
Dept: FAMILY MEDICINE CLINIC | Age: 67
End: 2022-09-06

## 2022-09-06 ENCOUNTER — OFFICE VISIT (OUTPATIENT)
Dept: FAMILY MEDICINE CLINIC | Age: 67
End: 2022-09-06
Payer: MEDICARE

## 2022-09-06 DIAGNOSIS — Z23 ENCOUNTER FOR IMMUNIZATION: Primary | ICD-10-CM

## 2022-09-06 DIAGNOSIS — Z71.84 TRAVEL ADVICE ENCOUNTER: Primary | ICD-10-CM

## 2022-09-06 PROCEDURE — 90746 HEPB VACCINE 3 DOSE ADULT IM: CPT | Performed by: FAMILY MEDICINE

## 2022-09-06 PROCEDURE — 99211 OFF/OP EST MAY X REQ PHY/QHP: CPT | Performed by: FAMILY MEDICINE

## 2022-09-06 RX ORDER — SOLIFENACIN SUCCINATE 10 MG/1
10 TABLET, FILM COATED ORAL DAILY
COMMUNITY

## 2022-09-06 RX ORDER — AZITHROMYCIN 250 MG/1
TABLET, FILM COATED ORAL
Qty: 6 TABLET | Refills: 0 | Status: SHIPPED | OUTPATIENT
Start: 2022-09-06

## 2022-09-06 NOTE — TELEPHONE ENCOUNTER
Patient getting ready to go to [place] on 9/23. Requesting to take an antibiotic with her on vacation just in case. Requests script be sent to [facility].

## 2022-09-06 NOTE — PATIENT INSTRUCTIONS
Vaccine Information Statement    Hepatitis B Vaccine: What You Need to Know    Many vaccine information statements are available in [place]ian and other languages. See www.immunize.org/vis. Hojas de información sobre vacunas están disponibles en español y en muchos otros idiomas. Visite www.immunize.org/vis. 1. Why get vaccinated? Hepatitis B vaccine can prevent hepatitis B. Hepatitis B is a liver disease that can cause mild illness lasting a few weeks, or it can lead to a serious, lifelong illness. Acute hepatitis B infection is a short-term illness that can lead to fever, fatigue, loss of appetite, nausea, vomiting, jaundice (yellow skin or eyes, dark urine, héctor-colored bowel movements), and pain in the muscles, joints, and stomach. Chronic hepatitis B infection is a long-term illness that occurs when the hepatitis B virus remains in a persons body. Most people who go on to develop chronic hepatitis B do not have symptoms, but it is still very serious and can lead to liver damage (cirrhosis), liver cancer, and death. Chronically infected people can spread hepatitis B virus to others, even if they do not feel or look sick themselves. Hepatitis B is spread when blood, semen, or other body fluid infected with the hepatitis B virus enters the body of a person who is not infected. People can become infected through:  Birth (if a pregnant person has hepatitis B, their baby can become infected)  Sharing items such as razors or toothbrushes with an infected person  Contact with the blood or open sores of an infected person  Sex with an infected partner  Sharing needles, syringes, or other drug-injection equipment  Exposure to blood from needlesticks or other sharp instruments    Most people who are vaccinated with hepatitis B vaccine are immune for life. 2. Hepatitis B vaccine    Hepatitis B vaccine is usually given as 2, 3, or 4 shots.     Infants should get their first dose of hepatitis B vaccine at birth and will usually complete the series at 8-20 months of age. The birth dose of hepatitis B vaccine is an important part of preventing long-term illness in infants and the spread of hepatitis B in the [place]. Children and adolescents younger than 23years of age who have not yet gotten the vaccine should be vaccinated. Adults who were not vaccinated previously and want to be protected against hepatitis B can also get the vaccine. Hepatitis B vaccine is also recommended for the following people:    People whose sex partners have hepatitis B  Sexually active persons who are not in a long-term, monogamous relationship  People seeking evaluation or treatment for a sexually transmitted disease  Victims of sexual assault or abuse  Men who have sexual contact with other men  People who share needles, syringes, or other drug-injection equipment  People who live with someone infected with the hepatitis B virus  Health care and public safety workers at risk for exposure to blood or body fluids  Residents and staff of facilities for developmentally disabled people  People living in penitentiary or [facility]  Travelers to regions with increased rates of hepatitis B  People with chronic liver disease, kidney disease on dialysis, HIV infection, infection with hepatitis C, or diabetes    Hepatitis B vaccine may be given as a stand-alone vaccine, or as part of a combination vaccine (a type of vaccine that combines more than one vaccine together into one shot). Hepatitis B vaccine may be given at the same time as other vaccines. 3. Talk with your health care provider    Tell your vaccination provider if the person getting the vaccine:  Has had an allergic reaction after a previous dose of hepatitis B vaccine, or has any severe, life-threatening allergies     In some cases, your health care provider may decide to postpone hepatitis B vaccination until a future visit.     Pregnant or breastfeeding people should be vaccinated if they are at risk for getting hepatitis B. Pregnancy or breastfeeding are not reasons to avoid hepatitis B vaccination. People with minor illnesses, such as a cold, may be vaccinated. People who are moderately or severely ill should usually wait until they recover before getting hepatitis B vaccine. Your health care provider can give you more information. 4. Risks of a vaccine reaction    Soreness where the shot is given or fever can happen after hepatitis B vaccination. People sometimes faint after medical procedures, including vaccination. Tell your provider if you feel dizzy or have vision changes or ringing in the ears. As with any medicine, there is a very remote chance of a vaccine causing a severe allergic reaction, other serious injury, or death. 5. What if there is a serious problem? An allergic reaction could occur after the vaccinated person leaves the clinic. If you see signs of a severe allergic reaction (hives, swelling of the face and throat, difficulty breathing, a fast heartbeat, dizziness, or weakness), call 9-1-1 and get the person to the nearest hospital.    For other signs that concern you, call your health care provider. Adverse reactions should be reported to the Vaccine Adverse Event Reporting System (VAERS). Your health care provider will usually file this report, or you can do it yourself. Visit the VAERS website at www.vaers. hhs.gov or call 1-132.185.6749. VAERS is only for reporting reactions, and VAERS staff members do not give medical advice. 6. The National Vaccine Injury Compensation Program    The [facility] Vaccine Injury Compensation Program (VICP) is a federal program that was created to compensate people who may have been injured by certain vaccines. Claims regarding alleged injury or death due to vaccination have a time limit for filing, which may be as short as two years.  Visit the VICP website at www.hrsa.gov/vaccinecompensation or call 1-636.315.8121 to learn about the program and about filing a claim. 7. How can I learn more? Ask your health care provider. Call your local or state health department. Visit the website of the Food and Drug Administration (FDA) for vaccine package inserts and additional information at https://www.reyes.com/. Contact the Centers for Disease Control and Prevention (CDC): Call 0-109.833.4447 (3-538-FEC-INFO) or  Visit CDCs website at www.cdc.gov/vaccines. Vaccine Information Statement   Hepatitis B Vaccine   10/15/2021  42 JOHN Campos [id number]   Department of Health and Human Services  Centers for Disease Control and Prevention    Office Use Only

## 2022-10-06 ENCOUNTER — TELEPHONE (OUTPATIENT)
Dept: FAMILY MEDICINE CLINIC | Age: 67
End: 2022-10-06

## 2022-10-06 NOTE — TELEPHONE ENCOUNTER
Pt requesting a call back in regards to testing positive for covid. Pt stated that she tested positive on Tuesday 10/4/22 and 2 days prior when she was in [place]. Pt stated that she needs some advice on what to do next. Pt stated that she isn't feeling bad.

## 2022-10-09 ENCOUNTER — HOSPITAL ENCOUNTER (EMERGENCY)
Age: 67
Discharge: HOME | End: 2022-10-09
Attending: EMERGENCY MEDICINE
Payer: MEDICARE

## 2022-10-09 VITALS
SYSTOLIC BLOOD PRESSURE: 178 MMHG | DIASTOLIC BLOOD PRESSURE: 96 MMHG | RESPIRATION RATE: 16 BRPM | HEIGHT: 64 IN | OXYGEN SATURATION: 100 % | TEMPERATURE: 98.4 F | WEIGHT: 218 LBS | BODY MASS INDEX: 37.22 KG/M2 | HEART RATE: 88 BPM

## 2022-10-09 DIAGNOSIS — R05.1 ACUTE COUGH: Primary | ICD-10-CM

## 2022-10-09 DIAGNOSIS — R03.0 ELEVATED BLOOD PRESSURE READING: ICD-10-CM

## 2022-10-09 PROCEDURE — U0005 INFEC AGEN DETEC AMPLI PROBE: HCPCS

## 2022-10-09 PROCEDURE — 99283 EMERGENCY DEPT VISIT LOW MDM: CPT

## 2022-10-09 NOTE — ED PROVIDER NOTES
EMERGENCY DEPARTMENT HISTORY AND PHYSICAL EXAM          Date: 10/9/2022  Patient Name: Santino Peguero    History of Presenting Illness     Chief Complaint   Patient presents with    Positive For Covid-19       History Provided By: Patient    HPI: Santino Peguero is a 78 y.o. female, pmhx diabetes, hypertension, high cholesterol and hypothyroidism, presenting ambulatory to the ER for COVID test.  Patient explains she started with symptoms last Thursday while she was in [place]. On Friday she tested positive for COVID. She took a home test today and she states she thinks it is wrong so she came in for a test.  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 She denies any fevers, chills, nausea, vomiting and diarrhea. She also denies any shortness of breath/difficulty breathing. She still has a mild cough. PCP: Randi Kuhn MD    Allergies: Diovan    There are no other complaints, changes, or physical findings at this time. Current Outpatient Medications   Medication Sig Dispense Refill    solifenacin (VESICARE) 10 mg tablet Take 10 mg by mouth daily. azithromycin (ZITHROMAX) 250 mg tablet Take two tablets once then one tablet daily for 4 days for severe traveler's diarrhea 6 Tablet 0    atorvastatin (LIPITOR) 40 mg tablet Take 1 Tablet by mouth daily. Indications: cholesterol and heart 90 Tablet 3    levothyroxine (SYNTHROID) 200 mcg tablet Take 1 Tablet by mouth Daily (before breakfast). Indications: thyroid 90 Tablet 3    potassium chloride SR (KLOR-CON 10) 10 mEq tablet Take 1 Tablet by mouth daily. Indications: mineral 90 Tablet 3    furosemide (LASIX) 40 mg tablet Take 1 Tablet by mouth daily. 90 Tablet 3    canagliflozin-metformin 150-1,000 mg TBph Take 2 Tablets by mouth daily. Indications: sugar 180 Each 3    losartan (COZAAR) 100 mg tablet Take 1 Tablet by mouth daily.  90 Tablet 3    meloxicam (MOBIC) 7.5 mg tablet Take 1 Tablet by mouth daily. Indications: arthritis 90 Tablet 3    omeprazole (PRILOSEC) 40 mg capsule Take 1 Capsule by mouth daily. 90 Capsule 3    exenatide microspheres (Bydureon BCise) 2 mg/0.85 mL atIn INJECT 2MG ONCE WEEKLY FOR  DIABETES 12 Each 3    mirabegron ER (MYRBETRIQ) 50 mg ER tablet Take 50 mg by mouth daily. aspirin delayed-release 81 mg tablet Take 1 Tab by mouth daily. 90 Tab 3    ondansetron (ZOFRAN ODT) 8 mg disintegrating tablet Take 1 Tab by mouth three (3) times daily as needed for Nausea. 20 Tab 1       Past History     Past Medical History:  Past Medical History:   Diagnosis Date    Burning with urination     OVER ACTIVE BLADDER    Colon polyp 5/6/2016 2013, due for recheck 2018     Essential hypertension with goal blood pressure less than 130/85 5/6/2016    Gastroesophageal reflux disease without esophagitis 5/6/2016    No frazier's on EGD 2008     GERD (gastroesophageal reflux disease)     Malignant neoplasm of central portion of left female breast ([facility] [address] 5/6/2016    S/p lumpectomy 2004 at [facility], nodes clean. DCIS per pt. Had breast reduction at [place] [address] 2012. no chemo needed. Pure hypercholesterolemia 5/6/2016    Seizures ([facility] [address]     Thyroid disease     Type 2 diabetes mellitus without complication ([facility] [address] 2/2/2592       Past Surgical History:  Past Surgical History:   Procedure Laterality Date    COLONOSCOPY N/A 7/7/2020    COLONOSCOPY performed by Conrado Weinberg MD at [facility] ENDOSCOPY    HX BREAST LUMPECTOMY Left 2004    \"below stage 1 cancer\" ? DCIS    HX BRE[facility] REDUCTION Bilateral [facility]    [place] CARDIAC SURG PROCEDURE UNLIST  2010    ABLATION       Family History:  Family History   Problem Relation Age of Onset    Colon Cancer Paternal Aunt     Hypertension Paternal Aunt     Diabetes Paternal Aunt     Cancer Paternal Aunt     No Known Problems Mother     Diabetes Father     Cancer Maternal Grandfather     Heart Disease Paternal Grandmother     Stroke Paternal Grandmother     Diabetes Paternal Grandmother        Social History:  Social History     Tobacco Use    Smoking status: Never    Smokeless tobacco: Never   Vaping Use    Vaping Use: Never used   Substance Use Topics    Alcohol use: No     Alcohol/week: 0.0 standard drinks    Drug use: No       Allergies: Allergies   Allergen Reactions    Diovan [Valsartan] Cough         Review of Systems   Review of Systems   Constitutional:  Negative for activity change, appetite change, chills, fever and unexpected weight change. HENT:  Negative for congestion. Eyes:  Negative for pain and visual disturbance. Respiratory:  Positive for cough. Negative for shortness of breath. Cardiovascular:  Negative for chest pain. Gastrointestinal:  Negative for abdominal pain, diarrhea, nausea and vomiting. Genitourinary:  Negative for dysuria. Musculoskeletal:  Negative for back pain. Skin:  Negative for rash. Neurological:  Negative for headaches. Physical Exam   Physical Exam  Vitals and nursing note reviewed. Constitutional:       Appearance: She is well-developed. She is not diaphoretic. Comments: This is a morbidly obese middle-aged female with elevated blood pressure, sitting comfortable, in minimal acute distress   HENT:      Head: Normocephalic and atraumatic. Eyes:      General:         Right eye: No discharge. Left eye: No discharge. Conjunctiva/sclera: Conjunctivae normal.      Pupils: Pupils are equal, round, and reactive to light. Cardiovascular:      Rate and Rhythm: Normal rate and regular rhythm. Pulses: Normal pulses. Heart sounds: Normal heart sounds. No murmur heard. Pulmonary:      Effort: Pulmonary effort is normal. No respiratory distress. Breath sounds: Normal breath sounds. No wheezing, rhonchi or rales. Musculoskeletal:         General: Normal range of motion. Cervical back: Normal range of motion and neck supple.    Skin:     General: Skin is warm and dry. Findings: No rash. Neurological:      Mental Status: She is alert and oriented to person, place, and time. Cranial Nerves: No cranial nerve deficit. Motor: No abnormal muscle tone. Diagnostic Study Results     Labs -   No results found for this or any previous visit (from the past 12 hour(s)). Radiologic Studies -   No orders to display     CT Results  (Last 48 hours)      None          CXR Results  (Last 48 hours)      None              Medical Decision Making   I am the first provider for this patient. I reviewed the vital signs, available nursing notes, past medical history, past surgical history, family history and social history. Vital Signs-Reviewed the patient's vital signs. Patient Vitals for the past 12 hrs:   Temp Pulse Resp BP SpO2   10/09/22 1544 -- -- -- -- 100 %   10/09/22 1540 98.4 °F (36.9 °C) 80 16 (!) 202/102 99 %       Pulse Oximetry Analysis - 100% on RA    Records Reviewed: Nursing Notes and Old Medical Records    Provider Notes (Medical Decision Making):   MDM: Middle-aged female with multiple medical problems who recently had COVID is presenting to the ER for repeat test to determine clearance of infection. I explained to patient that repeat test does not determine if patient has cleared the infection or not and in fact her test may be positive but she may be safe to go into the local area given it has been greater than 5 days since her symptoms started and the fact that she is not had any fever for the entire duration of her illness. Patient is hemodynamically stable though quite hypertensive with normal saturations. She admits she has not taken her medications today as she left the house without really doing anything for herself. At this time I do not think labs or x-ray are warranted. Patient is requesting COVID test and I explained will be 2 days before her results are obtained and she is okay with that plan.   I also explained that its been 10 days since her infection started so she is safe to go back out into the community tomorrow. ED Course:   Initial assessment performed. The patients presenting problems have been discussed, and they are in agreement with the care plan formulated and outlined with them. I have encouraged them to ask questions as they arise throughout their visit. Discharge note: And appropriate for outpatient management. All questions have been answered, pt voiced understanding and agreement with plan. Specific return precautions provided as well as instructions to return to the ED should sx worsen at any time. Vital signs stable for discharge. Critical Care Time:   0      Diagnosis     Clinical Impression: No diagnosis found. PLAN:  1. Current Discharge Medication List        2. Follow-up Information    None       Return to ED if worse     Disposition:  Home       Please note, this dictation was completed with [employer], the computer voice recognition software. Quite often unanticipated grammatical, syntax, homophones, and other interpretive errors are inadvertently transcribed by the computer software. Please disregard these errors. Please excuse any errors that have escaped final proof reading.

## 2022-10-11 LAB
SARS-COV-2, XPLCVT: DETECTED
SOURCE, COVRS: ABNORMAL

## 2022-11-30 ENCOUNTER — TELEPHONE (OUTPATIENT)
Dept: FAMILY MEDICINE CLINIC | Age: 67
End: 2022-11-30

## 2022-11-30 ENCOUNTER — NURSE TRIAGE (OUTPATIENT)
Dept: OTHER | Facility: CLINIC | Age: 67
End: 2022-11-30

## 2022-11-30 NOTE — TELEPHONE ENCOUNTER
Location of patient: [address] [facility]  call from Dian Law at [facility] with [employer]. Subjective: Caller states \"I have been having blood pressure problems since Thanksgiving\"     Current Symptoms: fatigue, dizziness (resolved), recent blood pressures 165/111 (today, 180/111 (yesterday), 186/116, 152/103 (just recently resumed taking Losartan)    Onset: 6 days ago; worsening    Associated Symptoms: NA    Pain Severity: 0/10    Temperature: un-assessed     What has been tried: Resumed taking Losartan last Friday    LMP: NA Pregnant: No    Recommended disposition:  Discuss with PCP - Call back from Nurse within one hour    Care advice provided, patient verbalizes understanding; denies any other questions or concerns; instructed to call back for any new or worsening symptoms. Writer spoke with Diego Eugene at [facility] to really patient information. Nurse Ira Tabares will call the patient back within the hour. Attention Provider: Thank you for allowing me to participate in the care of your patient. The patient was connected to triage in response to information provided to the [facility]. Please do not respond through this encounter as the response is not directed to a shared pool.       Reason for Disposition   Systolic BP >= 101 OR Diastolic >= 612, and missed most recent dose of blood pressure medication    Protocols used: Blood Pressure - High-ADULT-OH

## 2022-11-30 NOTE — TELEPHONE ENCOUNTER
Called and spoke with patient. Pt stated that she takes her BP in the morning before taking her medications. She stated that she was NOT taking the blood pressure medication that was prescribed (Losartan) until just a few days ago when her blood pressure got higher (constantly around 165/111) She denies dizziness, lightheadedness, sweating, chest pain, or SOB. Advised she continue to take her medication as prescribed. May take a week or two to take effect and lower blood pressures. Instructed to take BP 1 to 2 hours after taking medication to see if lower. If develops any of the above sx, go to ER. If no better, advised to call office and schedule appointment to evaluate BP and medication dose. Pt verbalized understanding.

## 2022-11-30 NOTE — TELEPHONE ENCOUNTER
Please call pt at [phone]  Re: elevated bp  165/111    Triage nurse from [facility] called wanted pt to be called asap

## 2022-12-09 DIAGNOSIS — E78.00 PURE HYPERCHOLESTEROLEMIA: ICD-10-CM

## 2022-12-12 RX ORDER — ATORVASTATIN CALCIUM 40 MG/1
TABLET, FILM COATED ORAL
Qty: 90 TABLET | Refills: 3 | Status: SHIPPED | OUTPATIENT
Start: 2022-12-12 | End: 2022-12-14 | Stop reason: SDUPTHER

## 2022-12-14 ENCOUNTER — OFFICE VISIT (OUTPATIENT)
Dept: FAMILY MEDICINE CLINIC | Age: 67
End: 2022-12-14
Payer: MEDICARE

## 2022-12-14 VITALS
DIASTOLIC BLOOD PRESSURE: 105 MMHG | HEIGHT: 64 IN | HEART RATE: 73 BPM | SYSTOLIC BLOOD PRESSURE: 160 MMHG | OXYGEN SATURATION: 100 % | WEIGHT: 216.5 LBS | TEMPERATURE: 97.5 F | RESPIRATION RATE: 18 BRPM | BODY MASS INDEX: 36.96 KG/M2

## 2022-12-14 DIAGNOSIS — E78.2 MIXED HYPERLIPIDEMIA: ICD-10-CM

## 2022-12-14 DIAGNOSIS — E11.65 TYPE 2 DIABETES MELLITUS WITH HYPERGLYCEMIA, WITHOUT LONG-TERM CURRENT USE OF INSULIN (HCC): Primary | ICD-10-CM

## 2022-12-14 DIAGNOSIS — I10 ESSENTIAL HYPERTENSION WITH GOAL BLOOD PRESSURE LESS THAN 130/85: ICD-10-CM

## 2022-12-14 DIAGNOSIS — E03.9 ACQUIRED HYPOTHYROIDISM: ICD-10-CM

## 2022-12-14 DIAGNOSIS — E78.00 PURE HYPERCHOLESTEROLEMIA: ICD-10-CM

## 2022-12-14 DIAGNOSIS — E11.9 TYPE 2 DIABETES MELLITUS WITHOUT COMPLICATION, WITHOUT LONG-TERM CURRENT USE OF INSULIN (HCC): ICD-10-CM

## 2022-12-14 PROCEDURE — G8399 PT W/DXA RESULTS DOCUMENT: HCPCS | Performed by: FAMILY MEDICINE

## 2022-12-14 PROCEDURE — 3074F SYST BP LT 130 MM HG: CPT | Performed by: FAMILY MEDICINE

## 2022-12-14 PROCEDURE — 2022F DILAT RTA XM EVC RTNOPTHY: CPT | Performed by: FAMILY MEDICINE

## 2022-12-14 PROCEDURE — G8427 DOCREV CUR MEDS BY ELIG CLIN: HCPCS | Performed by: FAMILY MEDICINE

## 2022-12-14 PROCEDURE — G8755 DIAS BP > OR = 90: HCPCS | Performed by: FAMILY MEDICINE

## 2022-12-14 PROCEDURE — 1101F PT FALLS ASSESS-DOCD LE1/YR: CPT | Performed by: FAMILY MEDICINE

## 2022-12-14 PROCEDURE — 3046F HEMOGLOBIN A1C LEVEL >9.0%: CPT | Performed by: FAMILY MEDICINE

## 2022-12-14 PROCEDURE — 3017F COLORECTAL CA SCREEN DOC REV: CPT | Performed by: FAMILY MEDICINE

## 2022-12-14 PROCEDURE — 1123F ACP DISCUSS/DSCN MKR DOCD: CPT | Performed by: FAMILY MEDICINE

## 2022-12-14 PROCEDURE — 3078F DIAST BP <80 MM HG: CPT | Performed by: FAMILY MEDICINE

## 2022-12-14 PROCEDURE — G8417 CALC BMI ABV UP PARAM F/U: HCPCS | Performed by: FAMILY MEDICINE

## 2022-12-14 PROCEDURE — 1090F PRES/ABSN URINE INCON ASSESS: CPT | Performed by: FAMILY MEDICINE

## 2022-12-14 PROCEDURE — 99214 OFFICE O/P EST MOD 30 MIN: CPT | Performed by: FAMILY MEDICINE

## 2022-12-14 PROCEDURE — G8753 SYS BP > OR = 140: HCPCS | Performed by: FAMILY MEDICINE

## 2022-12-14 PROCEDURE — G8536 NO DOC ELDER MAL SCRN: HCPCS | Performed by: FAMILY MEDICINE

## 2022-12-14 PROCEDURE — G8510 SCR DEP NEG, NO PLAN REQD: HCPCS | Performed by: FAMILY MEDICINE

## 2022-12-14 RX ORDER — ATORVASTATIN CALCIUM 40 MG/1
TABLET, FILM COATED ORAL
Qty: 90 TABLET | Refills: 3 | Status: SHIPPED | OUTPATIENT
Start: 2022-12-14

## 2022-12-14 RX ORDER — EXENATIDE 2 MG/.85ML
INJECTION, SUSPENSION, EXTENDED RELEASE SUBCUTANEOUS
Qty: 12 EACH | Refills: 3 | Status: SHIPPED | OUTPATIENT
Start: 2022-12-14

## 2022-12-14 RX ORDER — LEVOTHYROXINE SODIUM 200 UG/1
TABLET ORAL
Qty: 90 TABLET | Refills: 3 | Status: SHIPPED | OUTPATIENT
Start: 2022-12-14

## 2022-12-14 RX ORDER — SPIRONOLACTONE AND HYDROCHLOROTHIAZIDE 25; 25 MG/1; MG/1
0.5 TABLET ORAL DAILY
Qty: 45 TABLET | Refills: 3 | Status: SHIPPED | OUTPATIENT
Start: 2022-12-14

## 2022-12-14 RX ORDER — LOSARTAN POTASSIUM 100 MG/1
100 TABLET ORAL DAILY
Qty: 90 TABLET | Refills: 3 | Status: SHIPPED | OUTPATIENT
Start: 2022-12-14

## 2022-12-14 NOTE — PROGRESS NOTES
Chief Complaint   Patient presents with    Hypertension     Diabetes       1. Have you been to the ER, urgent care clinic since your last visit? Hospitalized since your last visit? No  2. Have you seen or consulted any other health care providers outside of the [address] [place] [address] since your last visit? Include any pap smears or colon screening. No    Lizzie Wen is a 78 y.o. female     HPI:  DM2  Not doing well on last A1c, but had \"fallen off the wagon\" and wasn't taking her meds much at all last year. Due for renewal. Back on bydureon and invokamet XR, but only taking 1x 150/1000mg pills daily. Lab Results   Component Value Date/Time    Hemoglobin A1c 12.4 (H) 01/11/2022 03:34 PM    Hemoglobin A1c 10.3 (H) 09/09/2020 02:35 PM    Hemoglobin A1c 10.9 (H) 06/10/2020 02:17 PM    Glucose 344 (H) 01/11/2022 03:34 PM    Glucose (POC) 153 (H) 07/07/2020 11:02 AM    Microalbumin/Creat ratio (mg/g creat) 344 (H) 01/11/2022 03:34 PM    Microalbumin,urine random 43.30 01/11/2022 03:34 PM    LDL, calculated 305.6 (H) 01/11/2022 03:34 PM    Creatinine 1.07 (H) 01/11/2022 03:34 PM     Last foot check good 2/2018  Last eye check ok with Dr. Yony Borja in [facility] 2022. Hypertension. Blood pressures above goal lately. Management at last visit included continuing current regimen, but has been off meds lately. Current regimen: angiotensin II receptor antagonist and loop diuretic. Symptoms include no sx, Patient denies chest pain, palpitations.   Lab review:   Lab Results   Component Value Date/Time    Sodium 135 (L) 01/11/2022 03:34 PM    Potassium 3.7 01/11/2022 03:34 PM    Chloride 99 01/11/2022 03:34 PM    CO2 30 01/11/2022 03:34 PM    Anion gap 6 01/11/2022 03:34 PM    Glucose 344 (H) 01/11/2022 03:34 PM    BUN 13 01/11/2022 03:34 PM    Creatinine 1.07 (H) 01/11/2022 03:34 PM    BUN/Creatinine ratio 12 01/11/2022 03:34 PM    GFR est AA >60 01/11/2022 03:34 PM    GFR est non-AA 51 (L) 01/11/2022 03:34 PM    Calcium 9.5 01/11/2022 03:34 PM     Hypothyroid  Lab Results   Component Value Date/Time    TSH 75.30 (H) 01/11/2022 03:34 PM   A little undertreated on last check on synthroid to 200mcg/day, but not taking it regularly. Due for recheck. Hyperlipidemia. Prev on lipitor 40, needs refill. Apryl well. No myalgias, arthralgias, unusual weakness. Lab Results   Component Value Date/Time    Cholesterol, total 424 (H) 01/11/2022 03:34 PM    HDL Cholesterol 86 01/11/2022 03:34 PM    LDL, calculated 305.6 (H) 01/11/2022 03:34 PM    VLDL, calculated 32.4 01/11/2022 03:34 PM    Triglyceride 162 (H) 01/11/2022 03:34 PM    CHOL/HDL Ratio 4.9 01/11/2022 03:34 PM     Lab Results   Component Value Date/Time    ALT (SGPT) 25 01/11/2022 03:34 PM    Alk. phosphatase 70 01/11/2022 03:34 PM    Bilirubin, total 0.4 01/11/2022 03:34 PM     PMH, SH, Medications/Allergies: reviewed, on chart. ROS:  Constitutional: No fever, chills or weight loss  Respiratory: No cough, SOB   CV: No chest pain or Palpitations    Visit Vitals  BP (!) 160/105   Pulse 73   Temp 97.5 °F (36.4 °C) (Temporal)   Resp 18   Ht 5' 4\" (1.626 m)   Wt 216 lb 8 oz (98.2 kg)   SpO2 100%   BMI 37.16 kg/m²     Wt Readings from Last 3 Encounters:   12/14/22 216 lb 8 oz (98.2 kg)   10/09/22 218 lb (98.9 kg)   01/11/22 234 lb (106.1 kg)   -2#  BP Readings from Last 3 Encounters:   12/14/22 (!) 160/105   10/09/22 (!) 178/96   01/11/22 (!) 146/84     Physical Examination: General appearance - alert, well appearing, and in no distress  Mental status - alert, oriented to person, place, and time  Eyes - pupils equal and reactive, extraocular eye movements intact  ENT - bilateral external ears and nose normal. Normal lips  Neck - supple, no significant adenopathy, no thyromegaly or mass.   Lymphatics - no palpable lymphadenopathy, no hepatosplenomegaly  Chest - clear to auscultation, no wheezes, rales or rhonchi, symmetric air entry  Heart - normal rate, regular rhythm, normal S1, S2, no murmurs, rubs, clicks or gallops  Extremities - peripheral pulses normal, no pedal edema, no clubbing or cyanosis. A/P:  DM2  Feeling good. Keep working on losing weight. Continue invokamet xr 1000/150, boost back to 2/d. Con't bydureon qwk. Recheck A1c, CMP in a few weeks to let the meds kick in a little bit more, and adjust PRN. HTN and Hx Edema  Not in goal control. Edema minimal. Ok to con't lasix at 40mg daily. Hypothyroid  Overtreated on last check, hasn't cut, will see today if to goal yet on 200mcg daily, if still suppressed, try again to change to 175's. HLD  well controlled. con't current tx for now, Check labs, adjust PRN. Br Ca  mammo pending 2/28 at [facility]. Overweight. Improving, keep working on carb control.     F/U 2-3mo/PRN

## 2022-12-14 NOTE — PROGRESS NOTES
Visit Vitals  BP (!) 160/105   Pulse 73   Temp 97.5 °F (36.4 °C) (Temporal)   Resp 18   Ht 5' 4\" (1.626 m)   Wt 216 lb 8 oz (98.2 kg)   SpO2 100%   BMI 37.16 kg/m²     Chief Complaint   Patient presents with    Hypertension     Diabetes       1. Have you been to the ER, urgent care clinic since your last visit? Hospitalized since your last visit? No    2. Have you seen or consulted any other health care providers outside of the [address] [place] [address] since your last visit? Include any pap smears or colon screening.  No

## 2023-01-18 ENCOUNTER — CLINICAL SUPPORT (OUTPATIENT)
Dept: FAMILY MEDICINE CLINIC | Age: 68
End: 2023-01-18

## 2023-01-18 DIAGNOSIS — E03.9 ACQUIRED HYPOTHYROIDISM: ICD-10-CM

## 2023-01-18 DIAGNOSIS — E78.2 MIXED HYPERLIPIDEMIA: ICD-10-CM

## 2023-01-18 DIAGNOSIS — E11.65 TYPE 2 DIABETES MELLITUS WITH HYPERGLYCEMIA, WITHOUT LONG-TERM CURRENT USE OF INSULIN (HCC): ICD-10-CM

## 2023-01-18 DIAGNOSIS — E11.9 TYPE 2 DIABETES MELLITUS WITHOUT COMPLICATION, WITHOUT LONG-TERM CURRENT USE OF INSULIN (HCC): ICD-10-CM

## 2023-01-19 LAB
ALBUMIN SERPL-MCNC: 3.7 G/DL (ref 3.5–5)
ALBUMIN/GLOB SERPL: 1.2 (ref 1.1–2.2)
ALP SERPL-CCNC: 87 U/L (ref 45–117)
ALT SERPL-CCNC: 18 U/L (ref 12–78)
ANION GAP SERPL CALC-SCNC: 6 MMOL/L (ref 5–15)
AST SERPL-CCNC: 16 U/L (ref 15–37)
BILIRUB SERPL-MCNC: 0.3 MG/DL (ref 0.2–1)
BUN SERPL-MCNC: 15 MG/DL (ref 6–20)
BUN/CREAT SERPL: 16 (ref 12–20)
CALCIUM SERPL-MCNC: 9.2 MG/DL (ref 8.5–10.1)
CHLORIDE SERPL-SCNC: 105 MMOL/L (ref 97–108)
CHOLEST SERPL-MCNC: 159 MG/DL
CO2 SERPL-SCNC: 28 MMOL/L (ref 21–32)
CREAT SERPL-MCNC: 0.94 MG/DL (ref 0.55–1.02)
CREAT UR-MCNC: 78.5 MG/DL
EST. AVERAGE GLUCOSE BLD GHB EST-MCNC: 309 MG/DL
GLOBULIN SER CALC-MCNC: 3.1 G/DL (ref 2–4)
GLUCOSE SERPL-MCNC: 227 MG/DL (ref 65–100)
HBA1C MFR BLD: 12.4 % (ref 4–5.6)
HDLC SERPL-MCNC: 58 MG/DL
HDLC SERPL: 2.7 (ref 0–5)
LDLC SERPL CALC-MCNC: 58.4 MG/DL (ref 0–100)
MICROALBUMIN UR-MCNC: 6.73 MG/DL
MICROALBUMIN/CREAT UR-RTO: 86 MG/G (ref 0–30)
POTASSIUM SERPL-SCNC: 4.2 MMOL/L (ref 3.5–5.1)
PROT SERPL-MCNC: 6.8 G/DL (ref 6.4–8.2)
SODIUM SERPL-SCNC: 139 MMOL/L (ref 136–145)
TRIGL SERPL-MCNC: 213 MG/DL (ref ?–150)
TSH SERPL DL<=0.05 MIU/L-ACNC: 2.76 UIU/ML (ref 0.36–3.74)
VLDLC SERPL CALC-MCNC: 42.6 MG/DL

## 2023-01-20 NOTE — PROGRESS NOTES
Labs good except for very high sugar again. Are you taking the invokamet? If so, are you interested in any of the new injections like ozempic, mounjaro, or trulicity, they can be very effective. Thyroid a lot better. Otherwise, continue current regimen.

## 2023-02-01 LAB — MAMMOGRAPHY, EXTERNAL: NORMAL

## 2023-02-15 ENCOUNTER — OFFICE VISIT (OUTPATIENT)
Dept: FAMILY MEDICINE CLINIC | Age: 68
End: 2023-02-15
Payer: MEDICARE

## 2023-02-15 VITALS
RESPIRATION RATE: 16 BRPM | OXYGEN SATURATION: 99 % | DIASTOLIC BLOOD PRESSURE: 62 MMHG | HEIGHT: 64 IN | BODY MASS INDEX: 34.86 KG/M2 | TEMPERATURE: 97.1 F | SYSTOLIC BLOOD PRESSURE: 106 MMHG | WEIGHT: 204.2 LBS | HEART RATE: 87 BPM

## 2023-02-15 DIAGNOSIS — E11.9 TYPE 2 DIABETES MELLITUS WITHOUT COMPLICATION, WITHOUT LONG-TERM CURRENT USE OF INSULIN (HCC): ICD-10-CM

## 2023-02-15 DIAGNOSIS — I10 ESSENTIAL HYPERTENSION WITH GOAL BLOOD PRESSURE LESS THAN 130/85: ICD-10-CM

## 2023-02-15 DIAGNOSIS — Z13.31 SCREENING FOR DEPRESSION: ICD-10-CM

## 2023-02-15 DIAGNOSIS — E03.9 ACQUIRED HYPOTHYROIDISM: ICD-10-CM

## 2023-02-15 DIAGNOSIS — E11.65 TYPE 2 DIABETES MELLITUS WITH HYPERGLYCEMIA, WITHOUT LONG-TERM CURRENT USE OF INSULIN (HCC): ICD-10-CM

## 2023-02-15 DIAGNOSIS — Z00.00 MEDICARE ANNUAL WELLNESS VISIT, SUBSEQUENT: Primary | ICD-10-CM

## 2023-02-15 DIAGNOSIS — E78.2 MIXED HYPERLIPIDEMIA: ICD-10-CM

## 2023-02-15 DIAGNOSIS — Z13.39 SCREENING FOR ALCOHOLISM: ICD-10-CM

## 2023-02-15 DIAGNOSIS — C50.112 MALIGNANT NEOPLASM OF CENTRAL PORTION OF LEFT FEMALE BREAST, UNSPECIFIED ESTROGEN RECEPTOR STATUS (HCC): ICD-10-CM

## 2023-02-15 DIAGNOSIS — E66.01 SEVERE OBESITY (BMI 35.0-39.9) WITH COMORBIDITY (HCC): ICD-10-CM

## 2023-02-15 PROCEDURE — 3046F HEMOGLOBIN A1C LEVEL >9.0%: CPT | Performed by: FAMILY MEDICINE

## 2023-02-15 PROCEDURE — 3078F DIAST BP <80 MM HG: CPT | Performed by: FAMILY MEDICINE

## 2023-02-15 PROCEDURE — 3074F SYST BP LT 130 MM HG: CPT | Performed by: FAMILY MEDICINE

## 2023-02-15 PROCEDURE — G8427 DOCREV CUR MEDS BY ELIG CLIN: HCPCS | Performed by: FAMILY MEDICINE

## 2023-02-15 PROCEDURE — 1123F ACP DISCUSS/DSCN MKR DOCD: CPT | Performed by: FAMILY MEDICINE

## 2023-02-15 PROCEDURE — G8536 NO DOC ELDER MAL SCRN: HCPCS | Performed by: FAMILY MEDICINE

## 2023-02-15 PROCEDURE — G8432 DEP SCR NOT DOC, RNG: HCPCS | Performed by: FAMILY MEDICINE

## 2023-02-15 PROCEDURE — 3017F COLORECTAL CA SCREEN DOC REV: CPT | Performed by: FAMILY MEDICINE

## 2023-02-15 PROCEDURE — G8399 PT W/DXA RESULTS DOCUMENT: HCPCS | Performed by: FAMILY MEDICINE

## 2023-02-15 PROCEDURE — 1101F PT FALLS ASSESS-DOCD LE1/YR: CPT | Performed by: FAMILY MEDICINE

## 2023-02-15 PROCEDURE — 2022F DILAT RTA XM EVC RTNOPTHY: CPT | Performed by: FAMILY MEDICINE

## 2023-02-15 PROCEDURE — G8417 CALC BMI ABV UP PARAM F/U: HCPCS | Performed by: FAMILY MEDICINE

## 2023-02-15 PROCEDURE — G0439 PPPS, SUBSEQ VISIT: HCPCS | Performed by: FAMILY MEDICINE

## 2023-02-15 RX ORDER — DULAGLUTIDE 3 MG/.5ML
3 INJECTION, SOLUTION SUBCUTANEOUS
Qty: 12 EACH | Refills: 3 | Status: SHIPPED | OUTPATIENT
Start: 2023-02-15

## 2023-02-15 NOTE — PROGRESS NOTES
This is the Subsequent Medicare Annual Wellness Exam, performed 12 months or more after the Initial AWV or the last Subsequent AWV    I have reviewed the patient's medical history in detail and updated the computerized patient record. Assessment/Plan   Education and counseling provided:  Are appropriate based on today's review and evaluation    1. Medicare annual wellness visit, subsequent  2. Screening for alcoholism  -     ME ANNUAL ALCOHOL SCREEN 15 MIN  3. Screening for depression  -     DEPRESSION SCREEN ANNUAL       Depression Risk Factor Screening     3 most recent PHQ Screens 12/14/2022   PHQ Not Done -   Little interest or pleasure in doing things Not at all   Feeling down, depressed, irritable, or hopeless Not at all   Total Score PHQ 2 0       Alcohol & Drug Abuse Risk Screen    Do you average more than 1 drink per night or more than 7 drinks a week:  No    On any one occasion in the past three months have you have had more than 3 drinks containing alcohol:  No          Functional Ability and Level of Safety    Hearing: Hearing is good. Activities of Daily Living: The home contains: grab bars  Patient does total self care      Ambulation: with no difficulty     Fall Risk:  Fall Risk Assessment, last 12 mths 2/15/2023   Able to walk? Yes   Fall in past 12 months? 0   Do you feel unsteady? 0   Are you worried about falling 0   Number of falls in past 12 months -   Fall with injury?  -      Abuse Screen:  Patient is not abused       Cognitive Screening    Has your family/caregiver stated any concerns about your memory: no     Cognitive Screening: Normal - Clock Drawing Test    Health Maintenance Due     Health Maintenance Due   Topic Date Due    Breast Cancer Screen Mammogram  02/28/2020    Eye Exam Retinal or Dilated  08/23/2020    Foot Exam Q1  06/10/2021    COVID-19 Vaccine (5 - Booster for Glenna Leventhal series) 06/14/2022    Flu Vaccine (1) 08/01/2022       Patient Care Team   Patient Care Team:  Kelley Morelos MD as PCP - General (Family Medicine)  Kelley Morelos MD as PCP - [facility] Empaneled Provider  Johanna Minor MD (General Surgery)    History     Patient Active Problem List   Diagnosis Code    Colon polyp S15.6    Essential hypertension with goal blood pressure less than 130/85 I10    Type 2 diabetes mellitus with hyperglycemia, without long-term current use of insulin (HCC) E11.65    Primary osteoarthritis of left shoulder M19.012    Gastroesophageal reflux disease without esophagitis K21.9    History of breast cancer Z85.3    Acquired hypothyroidism E03.9    Severe obesity (BMI 35.0-39. 9) with comorbidity ([address] E66.01    Mixed hyperlipidemia E78.2    Rectal bleeding K62.5     Past Medical History:   Diagnosis Date    Burning with urination     OVER ACTIVE BLADDER    Colon polyp 5/6/2016 2013, due for recheck 2018     Essential hypertension with goal blood pressure less than 130/85 5/6/2016    Gastroesophageal reflux disease without esophagitis 5/6/2016    No frazier's on EGD 2008     GERD (gastroesophageal reflux disease)     Malignant neoplasm of central portion of left female breast ([address] 5/6/2016    S/p lumpectomy 2004 at [facility], nodes clean. DCIS per pt. Had breast reduction at [facility] 2012. no chemo needed. Pure hypercholesterolemia 5/6/2016    Seizures ([address]     Thyroid disease     Type 2 diabetes mellitus without complication ([address] 0/1/0164      Past Surgical History:   Procedure Laterality Date    COLONOSCOPY N/A 7/7/2020    COLONOSCOPY performed by Marivel Martin MD at [facility] ENDOSCOPY    HX BREAST LUMPECTOMY Left 2004    \"below stage 1 cancer\" ? DCIS    HX BREAST REDUCTION Bilateral 2010    [facility]    OK UNLISTED PROCEDURE CARDIAC SURGERY  2010    ABLATION     Current Outpatient Medications   Medication Sig Dispense Refill    spironolactone-hydrochlorothiazide (ALDACTAZIDE) 25-25 mg per tablet Take 0.5 Tablets by mouth daily.  Indications: pressure 45 Tablet 3    atorvastatin (LIPITOR) 40 mg tablet TAKE 1 TABLET BY MOUTH  DAILY FOR CHOLESTEROL AND  HEART 90 Tablet 3    levothyroxine (Synthroid) 200 mcg tablet TAKE 1 TABLET BY MOUTH  DAILY BEFORE BREAKFAST FOR  THYROID 90 Tablet 3    canagliflozin-metformin 150-1,000 mg TBph Take 2 Tablets by mouth daily. Indications: sugar 180 Each 3    losartan (COZAAR) 100 mg tablet Take 1 Tablet by mouth daily. Indications: pressure 90 Tablet 3    exenatide microspheres (Bydureon BCise) 2 mg/0.85 mL atIn INJECT 2MG ONCE WEEKLY FOR  DIABETES 12 Each 3    meloxicam (MOBIC) 7.5 mg tablet TAKE 1 TABLET BY MOUTH  DAILY FOR ARTHRITIS 90 Tablet 1    omeprazole (PRILOSEC) 40 mg capsule TAKE 1 CAPSULE BY MOUTH  DAILY 90 Capsule 1    solifenacin (VESICARE) 10 mg tablet Take 10 mg by mouth daily. mirabegron ER (MYRBETRIQ) 50 mg ER tablet Take 50 mg by mouth daily. aspirin delayed-release 81 mg tablet Take 1 Tab by mouth daily. 90 Tab 3    ondansetron (ZOFRAN ODT) 8 mg disintegrating tablet Take 1 Tab by mouth three (3) times daily as needed for Nausea. 20 Tab 1     Allergies   Allergen Reactions    Diovan [Valsartan] Cough       Family History   Problem Relation Age of Onset    Colon Cancer Paternal Aunt     Hypertension Paternal Aunt     Diabetes Paternal Aunt     Cancer Paternal Aunt     No Known Problems Mother     Diabetes Father     Cancer Maternal Grandfather     Heart Disease Paternal Grandmother     Stroke Paternal Grandmother     Diabetes Paternal Grandmother      Social History     Tobacco Use    Smoking status: Never    Smokeless tobacco: Never   Substance Use Topics    Alcohol use: No     Alcohol/week: 0.0 standard drinks     Identified pt with two pt identifiers(name and ). Reviewed record in preparation for visit and have obtained necessary documentation.   Chief Complaint   Patient presents with    Annual Wellness Visit    Hypertension    Diabetes      Vitals:    02/15/23 1204   BP: 106/62   Pulse: 87 Resp: 16   Temp: 97.1 °F (36.2 °C)   TempSrc: Temporal   SpO2: 99%   Weight: 204 lb 3.2 oz (92.6 kg)   Height: 5' 4\" (1.626 m)   PainSc:   0 - No pain       Coordination of Care Questionnaire:  :       1. \"Have you been to the ER, urgent care clinic since your last visit? Hospitalized since your last visit? \" No    2. \"Have you seen or consulted any other health care providers outside of the [address] [place] [address] since your last visit? \" Yes Ob/gyn within the last couple of weeks       3. For patients aged 39-70: Has the patient had a colonoscopy / FIT/ Cologuard? Yes - no Care Gap present      If the patient is female:    4. For patients aged 41-77: Has the patient had a mammogram within the past 2 years? Yes - no Care Gap present      5. For patients aged 21-65: Has the patient had a pap smear? NA - based on age or sex    3 most recent PHQ Screens 12/14/2022   PHQ Not Done -   Little interest or pleasure in doing things Not at all   Feeling down, depressed, irritable, or hopeless Not at all   Total Score PHQ 2 0       Learning Assessment 12/14/2022   PRIMARY LEARNER Patient   PRIMARY LANGUAGE ENGLISH   LEARNER PREFERENCE PRIMARY READING   ANSWERED BY patient   RELATIONSHIP SELF       Abuse Screening Questionnaire 12/14/2022   Do you ever feel afraid of your partner? N   Are you in a relationship with someone who physically or mentally threatens you? N   Is it safe for you to go home? Y       The patient  does not have a history of falls. I did not complete a risk assessment.       Summer Waterman LPN

## 2023-02-15 NOTE — PROGRESS NOTES
Chief Complaint   Patient presents with    Annual Wellness Visit    Hypertension    Diabetes     1. Have you been to the ER, urgent care clinic since your last visit? Hospitalized since your last visit? No  2. Have you seen or consulted any other health care providers outside of the [address] [place] [address] since your last visit? Include any pap smears or colon screening. Nati JIMENEZ Francia Newton is a 78 y.o. female     HPI:  DM2  Not doing well on last A1c, but had \"fallen off the wagon\" and wasn't taking her meds much at all last year. Now back on and feeling good. Tried to boost the invokamet XR to 2 tab/d, but had a lot of nausea on that and went back to 1x 150/1000mg pills daily. Still taking bydureon 2mg/wk, but isn't lowing wt as quickly now. Interested in adjustment/ change to help wt more. Lab Results   Component Value Date/Time    Hemoglobin A1c 12.4 (H) 01/18/2023 11:26 AM    Hemoglobin A1c 12.4 (H) 01/11/2022 03:34 PM    Hemoglobin A1c 10.3 (H) 09/09/2020 02:35 PM    Glucose 227 (H) 01/18/2023 11:26 AM    Glucose (POC) 153 (H) 07/07/2020 11:02 AM    Microalbumin/Creat ratio (mg/g creat) 86 (H) 01/18/2023 11:26 AM    Microalbumin,urine random 6.73 01/18/2023 11:26 AM    LDL, calculated 58.4 01/18/2023 11:26 AM    Creatinine 0.94 01/18/2023 11:26 AM     Last foot check good 2/2018  Last eye check ok with Dr. Simona Osorio in [facility] 2022. Hypertension. Blood pressures above goal lately. Management at last visit included continuing current regimen, but has been off meds lately. Current regimen: angiotensin II receptor antagonist and loop diuretic. Symptoms include no sx, Patient denies chest pain, palpitations.   Lab review:   Lab Results   Component Value Date/Time    Sodium 139 01/18/2023 11:26 AM    Potassium 4.2 01/18/2023 11:26 AM    Chloride 105 01/18/2023 11:26 AM    CO2 28 01/18/2023 11:26 AM    Anion gap 6 01/18/2023 11:26 AM    Glucose 227 (H) 01/18/2023 11:26 AM    BUN 15 01/18/2023 11:26 AM    Creatinine 0.94 01/18/2023 11:26 AM    BUN/Creatinine ratio 16 01/18/2023 11:26 AM    GFR est AA >60 01/11/2022 03:34 PM    GFR est non-AA 51 (L) 01/11/2022 03:34 PM    Calcium 9.2 01/18/2023 11:26 AM     Hypothyroid  Lab Results   Component Value Date/Time    TSH 2.76 01/18/2023 11:26 AM   Good on last check on synthroid to 200mcg/day. Hyperlipidemia. Prev on lipitor 40, needs refill. Apryl well. No myalgias, arthralgias, unusual weakness. Lab Results   Component Value Date/Time    Cholesterol, total 159 01/18/2023 11:26 AM    HDL Cholesterol 58 01/18/2023 11:26 AM    LDL, calculated 58.4 01/18/2023 11:26 AM    VLDL, calculated 42.6 01/18/2023 11:26 AM    Triglyceride 213 (H) 01/18/2023 11:26 AM    CHOL/HDL Ratio 2.7 01/18/2023 11:26 AM     Lab Results   Component Value Date/Time    ALT (SGPT) 18 01/18/2023 11:26 AM    Alk. phosphatase 87 01/18/2023 11:26 AM    Bilirubin, total 0.3 01/18/2023 11:26 AM     PMH, SH, Medications/Allergies: reviewed, on chart.     ROS:  Constitutional: No fever, chills or weight loss  Respiratory: No cough, SOB   CV: No chest pain or Palpitations    Visit Vitals  /62 (BP 1 Location: Right arm, BP Patient Position: Sitting, BP Cuff Size: Adult long)   Pulse 87   Temp 97.1 °F (36.2 °C) (Temporal)   Resp 16   Ht 5' 4\" (1.626 m)   Wt 204 lb 3.2 oz (92.6 kg)   SpO2 99%   BMI 35.05 kg/m²   -12#  Wt Readings from Last 3 Encounters:   02/15/23 204 lb 3.2 oz (92.6 kg)   12/14/22 216 lb 8 oz (98.2 kg)   10/09/22 218 lb (98.9 kg)   -2#  BP Readings from Last 3 Encounters:   02/15/23 106/62   12/14/22 (!) 160/105   10/09/22 (!) 178/96     Physical Examination: General appearance - alert, well appearing, and in no distress  Mental status - alert, oriented to person, place, and time  Eyes - pupils equal and reactive, extraocular eye movements intact  ENT - bilateral external ears and nose normal. Normal lips  Neck - supple, no significant adenopathy, no thyromegaly or mass.  Lymphatics - no palpable lymphadenopathy, no hepatosplenomegaly  Chest - clear to auscultation, no wheezes, rales or rhonchi, symmetric air entry  Heart - normal rate, regular rhythm, normal S1, S2, no murmurs, rubs, clicks or gallops  Extremities - peripheral pulses normal, no pedal edema, no clubbing or cyanosis. A/P:  DM2  Feeling good. Keep working on losing weight, moving nicely, but would like stronger wt loss. Change to newer GLP should help. Continue invokamet xr 1000/150 1 daily. Change bydureon to trulicity 3mg weekly. Plan recheck A1c, CMP in 4/2023 and adjust PRN. If trulicity not covered, plan ozempic 2mg/wk. HTN and Hx Edema  Looking great. In goal control. Edema minimal. Ok to con't aldactazide 12/12mg daily. Hypothyroid  In goal on 200mcg/d. Con't. HLD  well controlled. con't current tx for now, Next  labs fall/winter 2023, adjust PRN. Br Ca  mammo good 2/23 at [facility]. Overweight. Improving, keep working on carb control.     F/U 3mo/PRN

## 2023-02-15 NOTE — PATIENT INSTRUCTIONS
Medicare Wellness Visit    The best way to live healthy is to have a lifestyle where you eat a well-balanced diet, exercise regularly, limit alcohol use, and quit all forms of tobacco/nicotine, if applicable. Regular preventive services are another way to keep healthy. Preventive services (vaccines, screening tests, monitoring & exams) can help personalize your care plan, which helps you manage your own care. Screening tests can find health problems at the earliest stages, when they are easiest to treat. [facility] follows the current, evidence-based guidelines published by the [facility] ([facility]) when recommending preventive services for our patients. Because we follow these guidelines, sometimes recommendations change over time as research supports it. (For example, a prostate screening blood test is no longer routinely recommended for men with no symptoms.)  Of course, you and your doctor may decide to screen more often for some diseases, based on your risk and co-morbidities (chronic disease you are already diagnosed with). Preventive services for you include:  - Medicare offers their members a free annual wellness visit, which is time for you and your primary care provider to discuss and plan for your preventive service needs. Take advantage of this benefit every year!     Here is a list of your current Health Maintenance items (your personalized list of preventive services) with a due date:    Health Maintenance Due   Topic Date Due    Eye Exam  08/23/2020    Diabetic Foot Care  06/10/2021    COVID-19 Vaccine (5 - Booster for Madonna Peers series) 06/14/2022

## 2023-03-28 ENCOUNTER — DOCUMENTATION ONLY (OUTPATIENT)
Dept: FAMILY MEDICINE CLINIC | Age: 68
End: 2023-03-28

## 2023-03-28 NOTE — PROGRESS NOTES
Results for orders placed or performed in visit on 03/28/23    MAMMOGRAPHY   Result Value Ref Range    Mammography, External BiRads 2: Benign

## 2023-04-21 DIAGNOSIS — M19.012 PRIMARY OSTEOARTHRITIS OF LEFT SHOULDER: ICD-10-CM

## 2023-04-21 DIAGNOSIS — K21.9 GASTROESOPHAGEAL REFLUX DISEASE WITHOUT ESOPHAGITIS: ICD-10-CM

## 2023-04-22 RX ORDER — OMEPRAZOLE 40 MG/1
40 CAPSULE, DELAYED RELEASE ORAL DAILY
Qty: 90 CAPSULE | Refills: 3 | Status: SHIPPED | OUTPATIENT
Start: 2023-04-22

## 2023-04-22 RX ORDER — MELOXICAM 7.5 MG/1
TABLET ORAL
Qty: 90 TABLET | Refills: 3 | Status: SHIPPED | OUTPATIENT
Start: 2023-04-22

## 2023-05-17 ENCOUNTER — OFFICE VISIT (OUTPATIENT)
Age: 68
End: 2023-05-17
Payer: MEDICARE

## 2023-05-17 VITALS
TEMPERATURE: 96.6 F | HEART RATE: 82 BPM | BODY MASS INDEX: 34.97 KG/M2 | SYSTOLIC BLOOD PRESSURE: 108 MMHG | OXYGEN SATURATION: 99 % | HEIGHT: 64 IN | DIASTOLIC BLOOD PRESSURE: 65 MMHG | RESPIRATION RATE: 19 BRPM | WEIGHT: 204.8 LBS

## 2023-05-17 DIAGNOSIS — E11.65 TYPE 2 DIABETES MELLITUS WITH HYPERGLYCEMIA, WITHOUT LONG-TERM CURRENT USE OF INSULIN (HCC): Primary | ICD-10-CM

## 2023-05-17 DIAGNOSIS — E03.9 ACQUIRED HYPOTHYROIDISM: ICD-10-CM

## 2023-05-17 DIAGNOSIS — E78.00 PURE HYPERCHOLESTEROLEMIA, UNSPECIFIED: ICD-10-CM

## 2023-05-17 DIAGNOSIS — I10 ESSENTIAL (PRIMARY) HYPERTENSION: ICD-10-CM

## 2023-05-17 DIAGNOSIS — E66.01 MORBID (SEVERE) OBESITY DUE TO EXCESS CALORIES (HCC): ICD-10-CM

## 2023-05-17 PROCEDURE — 1090F PRES/ABSN URINE INCON ASSESS: CPT | Performed by: FAMILY MEDICINE

## 2023-05-17 PROCEDURE — 3046F HEMOGLOBIN A1C LEVEL >9.0%: CPT | Performed by: FAMILY MEDICINE

## 2023-05-17 PROCEDURE — 1036F TOBACCO NON-USER: CPT | Performed by: FAMILY MEDICINE

## 2023-05-17 PROCEDURE — G8400 PT W/DXA NO RESULTS DOC: HCPCS | Performed by: FAMILY MEDICINE

## 2023-05-17 PROCEDURE — G8417 CALC BMI ABV UP PARAM F/U: HCPCS | Performed by: FAMILY MEDICINE

## 2023-05-17 PROCEDURE — 3078F DIAST BP <80 MM HG: CPT | Performed by: FAMILY MEDICINE

## 2023-05-17 PROCEDURE — G8427 DOCREV CUR MEDS BY ELIG CLIN: HCPCS | Performed by: FAMILY MEDICINE

## 2023-05-17 PROCEDURE — 2022F DILAT RTA XM EVC RTNOPTHY: CPT | Performed by: FAMILY MEDICINE

## 2023-05-17 PROCEDURE — 1123F ACP DISCUSS/DSCN MKR DOCD: CPT | Performed by: FAMILY MEDICINE

## 2023-05-17 PROCEDURE — 99214 OFFICE O/P EST MOD 30 MIN: CPT | Performed by: FAMILY MEDICINE

## 2023-05-17 PROCEDURE — 3017F COLORECTAL CA SCREEN DOC REV: CPT | Performed by: FAMILY MEDICINE

## 2023-05-17 PROCEDURE — 3074F SYST BP LT 130 MM HG: CPT | Performed by: FAMILY MEDICINE

## 2023-05-17 RX ORDER — CANAGLIFLOZIN AND METFORMIN HYDROCHLORIDE 150; 1000 MG/1; MG/1
TABLET, FILM COATED ORAL
COMMUNITY
Start: 2018-02-27

## 2023-05-17 RX ORDER — ASPIRIN 81 MG/1
TABLET ORAL
COMMUNITY
Start: 2018-05-22

## 2023-05-17 RX ORDER — LEVOTHYROXINE SODIUM 200 UG/1
CAPSULE ORAL
COMMUNITY
End: 2023-05-17 | Stop reason: SDUPTHER

## 2023-05-17 RX ORDER — OMEPRAZOLE 40 MG/1
40 CAPSULE, DELAYED RELEASE ORAL DAILY
COMMUNITY
Start: 2023-04-22

## 2023-05-17 RX ORDER — LEVOTHYROXINE SODIUM 200 MCG
TABLET ORAL
COMMUNITY
Start: 2023-03-13

## 2023-05-17 RX ORDER — SOLIFENACIN SUCCINATE 10 MG/1
TABLET, FILM COATED ORAL
COMMUNITY

## 2023-05-17 RX ORDER — LOSARTAN POTASSIUM 100 MG/1
TABLET ORAL
COMMUNITY
Start: 2018-02-27

## 2023-05-17 RX ORDER — ATORVASTATIN CALCIUM 40 MG/1
TABLET, FILM COATED ORAL
COMMUNITY
Start: 2018-02-27

## 2023-05-17 RX ORDER — LEVOTHYROXINE SODIUM 0.2 MG/1
TABLET ORAL
COMMUNITY
Start: 2022-12-14

## 2023-05-17 RX ORDER — EXENATIDE 2 MG/.65ML
INJECTION, SUSPENSION, EXTENDED RELEASE SUBCUTANEOUS
COMMUNITY
Start: 2018-02-27

## 2023-05-17 RX ORDER — DULAGLUTIDE 3 MG/.5ML
3 INJECTION, SOLUTION SUBCUTANEOUS
COMMUNITY
Start: 2023-02-15 | End: 2023-05-17 | Stop reason: DRUGHIGH

## 2023-05-17 RX ORDER — MELOXICAM 7.5 MG/1
TABLET ORAL
COMMUNITY
Start: 2018-02-27

## 2023-05-17 RX ORDER — ONDANSETRON 8 MG/1
TABLET, ORALLY DISINTEGRATING ORAL
COMMUNITY
Start: 2018-02-26

## 2023-05-17 SDOH — ECONOMIC STABILITY: INCOME INSECURITY: HOW HARD IS IT FOR YOU TO PAY FOR THE VERY BASICS LIKE FOOD, HOUSING, MEDICAL CARE, AND HEATING?: NOT VERY HARD

## 2023-05-17 SDOH — ECONOMIC STABILITY: HOUSING INSECURITY
IN THE LAST 12 MONTHS, WAS THERE A TIME WHEN YOU DID NOT HAVE A STEADY PLACE TO SLEEP OR SLEPT IN A SHELTER (INCLUDING NOW)?: NO

## 2023-05-17 SDOH — ECONOMIC STABILITY: FOOD INSECURITY: WITHIN THE PAST 12 MONTHS, YOU WORRIED THAT YOUR FOOD WOULD RUN OUT BEFORE YOU GOT MONEY TO BUY MORE.: NEVER TRUE

## 2023-05-17 SDOH — ECONOMIC STABILITY: FOOD INSECURITY: WITHIN THE PAST 12 MONTHS, THE FOOD YOU BOUGHT JUST DIDN'T LAST AND YOU DIDN'T HAVE MONEY TO GET MORE.: NEVER TRUE

## 2023-05-17 ASSESSMENT — PATIENT HEALTH QUESTIONNAIRE - PHQ9
SUM OF ALL RESPONSES TO PHQ QUESTIONS 1-9: 0
1. LITTLE INTEREST OR PLEASURE IN DOING THINGS: 0
SUM OF ALL RESPONSES TO PHQ QUESTIONS 1-9: 0
SUM OF ALL RESPONSES TO PHQ9 QUESTIONS 1 & 2: 0
SUM OF ALL RESPONSES TO PHQ QUESTIONS 1-9: 0
SUM OF ALL RESPONSES TO PHQ QUESTIONS 1-9: 0
2. FEELING DOWN, DEPRESSED OR HOPELESS: 0

## 2023-05-17 NOTE — PROGRESS NOTES
Steph Harding is a 78 y.o. female presenting for/with:    Chief Complaint   Patient presents with    Hypertension    Diabetes     Pt requesting a order for a new blood glucose machine. Send to [facility] mail order        Vitals:    05/17/23 1404   BP: 108/65   Site: Left Upper Arm   Position: Sitting   Cuff Size: Medium Adult   Pulse: 82   Resp: 19   Temp: (!) 96.6 °F (35.9 °C)   TempSrc: Temporal   SpO2: 99%   Weight: 204 lb 12.8 oz (92.9 kg)   Height: 5' 4\" (1.626 m)       Pain Scale: 0 - No pain/10  Pain Location:     1. \"Have you been to the ER, urgent care clinic since your last visit? Hospitalized since your last visit? \" No    2. \"Have you seen or consulted any other health care providers outside of the [address] [place] [address] since your last visit? \" No     3. For patients aged 39-70: Has the patient had a colonoscopy / FIT/ Cologuard? Yes - no Care Gap present     If the patient is female:    4. For patients aged 41-77: Has the patient had a mammogram within the past 2 years? Yes - no Care Gap present    5. For patients aged 21-65: Has the patient had a pap smear? NA - based on age    PHQ-9 Total Score: 0 (5/17/2023  1:57 PM)      Amb Fall Risk Assessment and TUG Test 5/17/2023   Do you feel unsteady or are you worried about falling?  no   2 or more falls in past year? no   Fall with injury in past year? no   Fall in past 12 months? -   Able to walk? -       \    No advance directive on file. Emergency contacts verified.

## 2023-05-17 NOTE — PROGRESS NOTES
Trell Matta is a 78 y.o. female  Chief Complaint   Patient presents with    Hypertension    Diabetes     Pt requesting a order for a new blood glucose machine. Send to [facility] order      HPI:  DM2  Not doing well on last A1c, but had \"fallen off the wagon\" and wasn't taking her meds much at all last year. Now back on and feeling good. Tried to boost the invokamet XR to 2 tab/d, but had a lot of nausea on that and went back to 1x 150/1000mg pills daily. Still taking bydureon 2mg/wk, but isn't lowing wt as quickly now. Interested in adjustment/ change to help wt more. Lab Results   Component Value Date    LABA1C 12.4 (H) 01/18/2023    LABA1C 12.4 (H) 01/11/2022    LABA1C 10.3 (H) 09/09/2020    GLUCOSE 227 (H) 01/18/2023    CREATININE 0.94 01/18/2023    LDLCALC 58.4 01/18/2023     Last foot check good 2/2018  Last eye check ok with Dr. Geovanna Sherman in [facility] 2022. Hypertension. Blood pressures above goal lately. Management at last visit included continuing current regimen, but has been off meds lately. Current regimen: angiotensin II receptor antagonist and loop diuretic. Symptoms include no sx, Patient denies chest pain, palpitations. Lab review:  Lab Results   Component Value Date     01/18/2023    K 4.2 01/18/2023     01/18/2023    CO2 28 01/18/2023    GLUCOSE 227 (H) 01/18/2023    BUN 15 01/18/2023    CREATININE 0.94 01/18/2023     Hypothyroid  Lab Results   Component Value Date    TSH 2.76 01/18/2023   Good on last check on synthroid to 200mcg/day. Hyperlipidemia. Prev on lipitor 40, needs refill. Johnna well. No myalgias, arthralgias, unusual weakness. Lab Results   Component Value Date    CHOL 159 01/18/2023    HDL 58 01/18/2023    LDLCALC 58.4 01/18/2023    TRIG 213 (H) 01/18/2023    AST 16 01/18/2023    ALT 18 01/18/2023    ALKPHOS 87 01/18/2023    BILITOT 0.3 01/18/2023     Reviewed PMH, PSH, SH, Medications, allergies (see chart).   Current Outpatient Medications

## 2023-05-18 LAB
CREAT UR-MCNC: 27.1 MG/DL
EST. AVERAGE GLUCOSE BLD GHB EST-MCNC: 226 MG/DL
HBA1C MFR BLD: 9.5 % (ref 4–5.6)
MICROALBUMIN UR-MCNC: 1.77 MG/DL
MICROALBUMIN/CREAT UR-RTO: 65 MG/G (ref 0–30)

## 2023-05-19 ENCOUNTER — TELEPHONE (OUTPATIENT)
Age: 68
End: 2023-05-19

## 2023-05-19 DIAGNOSIS — E66.01 SEVERE OBESITY (BMI 35.0-39.9) WITH COMORBIDITY (HCC): ICD-10-CM

## 2023-05-19 DIAGNOSIS — E11.65 TYPE 2 DIABETES MELLITUS WITH HYPERGLYCEMIA, WITHOUT LONG-TERM CURRENT USE OF INSULIN (HCC): Primary | ICD-10-CM

## 2023-11-14 RX ORDER — SPIRONOLACTONE AND HYDROCHLOROTHIAZIDE 25; 25 MG/1; MG/1
TABLET ORAL
Qty: 45 TABLET | Refills: 3 | Status: SHIPPED | OUTPATIENT
Start: 2023-11-14

## 2023-11-14 RX ORDER — CANAGLIFLOZIN AND METFORMIN HYDROCHLORIDE 150; 1000 MG/1; MG/1
TABLET, FILM COATED, EXTENDED RELEASE ORAL
Qty: 180 TABLET | Refills: 3 | Status: SHIPPED | OUTPATIENT
Start: 2023-11-14

## 2023-11-14 RX ORDER — ATORVASTATIN CALCIUM 40 MG/1
TABLET, FILM COATED ORAL
Qty: 90 TABLET | Refills: 3 | Status: SHIPPED | OUTPATIENT
Start: 2023-11-14

## 2023-12-30 RX ORDER — LOSARTAN POTASSIUM 100 MG/1
TABLET ORAL
Qty: 90 TABLET | Refills: 3 | Status: SHIPPED | OUTPATIENT
Start: 2023-12-30

## 2024-01-23 RX ORDER — LEVOTHYROXINE SODIUM 200 MCG
TABLET ORAL
Qty: 90 TABLET | Refills: 3 | Status: SHIPPED | OUTPATIENT
Start: 2024-01-23

## 2024-04-01 RX ORDER — MELOXICAM 7.5 MG/1
TABLET ORAL
Qty: 90 TABLET | Refills: 3 | Status: SHIPPED | OUTPATIENT
Start: 2024-04-01

## 2024-04-01 RX ORDER — OMEPRAZOLE 40 MG/1
40 CAPSULE, DELAYED RELEASE ORAL DAILY
Qty: 90 CAPSULE | Refills: 3 | Status: SHIPPED | OUTPATIENT
Start: 2024-04-01

## 2024-10-14 RX ORDER — SPIRONOLACTONE AND HYDROCHLOROTHIAZIDE 25; 25 MG/1; MG/1
TABLET ORAL
Qty: 45 TABLET | Refills: 3 | OUTPATIENT
Start: 2024-10-14

## 2024-10-14 RX ORDER — ATORVASTATIN CALCIUM 40 MG/1
TABLET, FILM COATED ORAL
Qty: 90 TABLET | Refills: 3 | OUTPATIENT
Start: 2024-10-14

## 2024-10-14 RX ORDER — CANAGLIFLOZIN AND METFORMIN HYDROCHLORIDE 150; 1000 MG/1; MG/1
TABLET, FILM COATED, EXTENDED RELEASE ORAL
Qty: 180 TABLET | Refills: 3 | OUTPATIENT
Start: 2024-10-14

## 2024-11-08 ENCOUNTER — OFFICE VISIT (OUTPATIENT)
Age: 69
End: 2024-11-08
Payer: MEDICARE

## 2024-11-08 VITALS
OXYGEN SATURATION: 100 % | BODY MASS INDEX: 37.69 KG/M2 | WEIGHT: 220.8 LBS | RESPIRATION RATE: 18 BRPM | HEART RATE: 75 BPM | HEIGHT: 64 IN | TEMPERATURE: 97.2 F | DIASTOLIC BLOOD PRESSURE: 86 MMHG | SYSTOLIC BLOOD PRESSURE: 140 MMHG

## 2024-11-08 DIAGNOSIS — I10 ESSENTIAL HYPERTENSION WITH GOAL BLOOD PRESSURE LESS THAN 130/85: ICD-10-CM

## 2024-11-08 DIAGNOSIS — E78.2 MIXED HYPERLIPIDEMIA: ICD-10-CM

## 2024-11-08 DIAGNOSIS — M19.049 HAND ARTHRITIS: ICD-10-CM

## 2024-11-08 DIAGNOSIS — E11.65 TYPE 2 DIABETES MELLITUS WITH HYPERGLYCEMIA, WITHOUT LONG-TERM CURRENT USE OF INSULIN (HCC): ICD-10-CM

## 2024-11-08 DIAGNOSIS — Z00.00 MEDICARE ANNUAL WELLNESS VISIT, SUBSEQUENT: Primary | ICD-10-CM

## 2024-11-08 DIAGNOSIS — E03.9 ACQUIRED HYPOTHYROIDISM: ICD-10-CM

## 2024-11-08 PROBLEM — C50.112 MALIGNANT NEOPLASM OF CENTRAL PORTION OF LEFT FEMALE BREAST, UNSPECIFIED ESTROGEN RECEPTOR STATUS (HCC): Status: RESOLVED | Noted: 2023-02-15 | Resolved: 2024-11-08

## 2024-11-08 PROCEDURE — 1036F TOBACCO NON-USER: CPT | Performed by: FAMILY MEDICINE

## 2024-11-08 PROCEDURE — 1090F PRES/ABSN URINE INCON ASSESS: CPT | Performed by: FAMILY MEDICINE

## 2024-11-08 PROCEDURE — 3017F COLORECTAL CA SCREEN DOC REV: CPT | Performed by: FAMILY MEDICINE

## 2024-11-08 PROCEDURE — 3077F SYST BP >= 140 MM HG: CPT | Performed by: FAMILY MEDICINE

## 2024-11-08 PROCEDURE — 1126F AMNT PAIN NOTED NONE PRSNT: CPT | Performed by: FAMILY MEDICINE

## 2024-11-08 PROCEDURE — G8400 PT W/DXA NO RESULTS DOC: HCPCS | Performed by: FAMILY MEDICINE

## 2024-11-08 PROCEDURE — G0439 PPPS, SUBSEQ VISIT: HCPCS | Performed by: FAMILY MEDICINE

## 2024-11-08 PROCEDURE — G8417 CALC BMI ABV UP PARAM F/U: HCPCS | Performed by: FAMILY MEDICINE

## 2024-11-08 PROCEDURE — 1123F ACP DISCUSS/DSCN MKR DOCD: CPT | Performed by: FAMILY MEDICINE

## 2024-11-08 PROCEDURE — 99214 OFFICE O/P EST MOD 30 MIN: CPT | Performed by: FAMILY MEDICINE

## 2024-11-08 PROCEDURE — 3046F HEMOGLOBIN A1C LEVEL >9.0%: CPT | Performed by: FAMILY MEDICINE

## 2024-11-08 PROCEDURE — G8427 DOCREV CUR MEDS BY ELIG CLIN: HCPCS | Performed by: FAMILY MEDICINE

## 2024-11-08 PROCEDURE — 3079F DIAST BP 80-89 MM HG: CPT | Performed by: FAMILY MEDICINE

## 2024-11-08 PROCEDURE — G8484 FLU IMMUNIZE NO ADMIN: HCPCS | Performed by: FAMILY MEDICINE

## 2024-11-08 PROCEDURE — 1159F MED LIST DOCD IN RCRD: CPT | Performed by: FAMILY MEDICINE

## 2024-11-08 PROCEDURE — 1160F RVW MEDS BY RX/DR IN RCRD: CPT | Performed by: FAMILY MEDICINE

## 2024-11-08 PROCEDURE — 2022F DILAT RTA XM EVC RTNOPTHY: CPT | Performed by: FAMILY MEDICINE

## 2024-11-08 RX ORDER — LEVOTHYROXINE SODIUM 200 UG/1
200 TABLET ORAL DAILY
Qty: 90 TABLET | Refills: 3 | Status: SHIPPED | OUTPATIENT
Start: 2024-11-08

## 2024-11-08 RX ORDER — CANAGLIFLOZIN AND METFORMIN HYDROCHLORIDE 150; 1000 MG/1; MG/1
2 TABLET, FILM COATED, EXTENDED RELEASE ORAL DAILY
Qty: 180 TABLET | Refills: 3 | Status: SHIPPED | OUTPATIENT
Start: 2024-11-08

## 2024-11-08 RX ORDER — LOSARTAN POTASSIUM 100 MG/1
100 TABLET ORAL DAILY
Qty: 90 TABLET | Refills: 3 | Status: SHIPPED | OUTPATIENT
Start: 2024-11-08

## 2024-11-08 RX ORDER — TIRZEPATIDE 2.5 MG/.5ML
2.5 INJECTION, SOLUTION SUBCUTANEOUS WEEKLY
Qty: 2 ML | Refills: 11 | Status: SHIPPED | OUTPATIENT
Start: 2024-11-08

## 2024-11-08 RX ORDER — ATORVASTATIN CALCIUM 40 MG/1
40 TABLET, FILM COATED ORAL DAILY
Qty: 90 TABLET | Refills: 3 | Status: SHIPPED | OUTPATIENT
Start: 2024-11-08

## 2024-11-08 RX ORDER — SPIRONOLACTONE AND HYDROCHLOROTHIAZIDE 25; 25 MG/1; MG/1
0.5 TABLET ORAL DAILY
Qty: 45 TABLET | Refills: 3 | Status: SHIPPED | OUTPATIENT
Start: 2024-11-08

## 2024-11-08 SDOH — ECONOMIC STABILITY: FOOD INSECURITY: WITHIN THE PAST 12 MONTHS, THE FOOD YOU BOUGHT JUST DIDN'T LAST AND YOU DIDN'T HAVE MONEY TO GET MORE.: NEVER TRUE

## 2024-11-08 SDOH — ECONOMIC STABILITY: FOOD INSECURITY: WITHIN THE PAST 12 MONTHS, YOU WORRIED THAT YOUR FOOD WOULD RUN OUT BEFORE YOU GOT MONEY TO BUY MORE.: NEVER TRUE

## 2024-11-08 SDOH — ECONOMIC STABILITY: INCOME INSECURITY: HOW HARD IS IT FOR YOU TO PAY FOR THE VERY BASICS LIKE FOOD, HOUSING, MEDICAL CARE, AND HEATING?: NOT VERY HARD

## 2024-11-08 ASSESSMENT — PATIENT HEALTH QUESTIONNAIRE - PHQ9
SUM OF ALL RESPONSES TO PHQ QUESTIONS 1-9: 0
SUM OF ALL RESPONSES TO PHQ9 QUESTIONS 1 & 2: 0
2. FEELING DOWN, DEPRESSED OR HOPELESS: NOT AT ALL
SUM OF ALL RESPONSES TO PHQ QUESTIONS 1-9: 0
1. LITTLE INTEREST OR PLEASURE IN DOING THINGS: NOT AT ALL

## 2024-11-08 ASSESSMENT — LIFESTYLE VARIABLES
HOW OFTEN DO YOU HAVE A DRINK CONTAINING ALCOHOL: NEVER
HOW MANY STANDARD DRINKS CONTAINING ALCOHOL DO YOU HAVE ON A TYPICAL DAY: PATIENT DOES NOT DRINK

## 2024-11-08 NOTE — PROGRESS NOTES
Janell Webber is a 69 y.o. female  Chief Complaint   Patient presents with    Medicare AWV     HPI:  DM2  Not doing well on last A1c, but had \"fallen off the wagon\" and wasn't taking her meds much at all last year. Back on meds over past year. Now back on and feeling good. Tried to boost the invokamet XR to 2 tab/d, but had a lot of nausea on that and went back to 1x 150/1000mg pills daily. Went off trulicity adjustment/ change to help wt more.    Lab Results   Component Value Date    LABA1C 9.5 (H) 05/17/2023    LABA1C 12.4 (H) 01/18/2023    LABA1C 12.4 (H) 01/11/2022    GLUCOSE 227 (H) 01/18/2023    CREATININE 0.94 01/18/2023     Last foot check good 2/2018  Last eye check ok with Dr. Ammon Lorenzana in [place] 2022. PT will arr f/u with them soon.    Hypertension.   Blood pressures above goal lately. Management at last visit included continuing current regimen, but has been off meds lately. Current regimen: angiotensin II receptor antagonist and loop diuretic. Symptoms include no sx, Patient denies chest pain, palpitations.  Lab review:  Lab Results   Component Value Date     01/18/2023    K 4.2 01/18/2023     01/18/2023    CO2 28 01/18/2023    GLUCOSE 227 (H) 01/18/2023    BUN 15 01/18/2023    CREATININE 0.94 01/18/2023     Hypothyroid  Lab Results   Component Value Date    TSH 2.76 01/18/2023   Good on last check on synthroid to 200mcg/day.     Hyperlipidemia.  Prev on lipitor 40, needs refill. Johnna well. No myalgias, arthralgias, unusual weakness.  Lab Results   Component Value Date    CHOL 159 01/18/2023    HDL 58 01/18/2023    TRIG 213 (H) 01/18/2023    AST 16 01/18/2023    ALT 18 01/18/2023    ALKPHOS 87 01/18/2023    BILITOT 0.3 01/18/2023     Reviewed PMH, PSH, SH, Medications, allergies (see chart).  Current Outpatient Medications   Medication Sig    meloxicam (MOBIC) 7.5 MG tablet TAKE 1 TABLET BY MOUTH DAILY FOR ARTHRITIS    omeprazole (PRILOSEC) 40 MG delayed release capsule TAKE 1

## 2024-11-08 NOTE — PROGRESS NOTES
Janell Webber is a 69 y.o. female presenting for/with:    Chief Complaint   Patient presents with    Medicare AWV       Vitals:    11/08/24 1300   BP: (!) 140/86   Pulse: 75   Resp: 18   Temp: 97.2 °F (36.2 °C)   TempSrc: Temporal   SpO2: 100%   Weight: 100.2 kg (220 lb 12.8 oz)   Height: 1.626 m (5' 4\")       Pain Scale: 0 - No pain/10  Pain Location:     \"Have you been to the ER, urgent care clinic since your last visit?  Hospitalized since your last visit?\"    NO    “Have you seen or consulted any other health care providers outside of [facility] since your last visit?”    NO       Have you had a mammogram?”   NO    Date of last Mammogram: 3/28/2023               11/8/2024     1:37 PM   PHQ-9    Little interest or pleasure in doing things 0   Feeling down, depressed, or hopeless 0   PHQ-2 Score 0   PHQ-9 Total Score 0           12/14/2022    12:00 AM   [facility] AMB LEARNING ASSESSMENT   Primary Learner Patient   Primary Language ENGLISH   Learning Preference READING   Answered By patient   Relationship to Learner SELF            11/8/2024     1:51 PM   Amb Fall Risk Assessment and TUG Test   Do you feel unsteady or are you worried about falling?  no   2 or more falls in past year? no   Fall with injury in past year? no           11/8/2024     1:00 PM   ADL ASSESSMENT   Feeding yourself No Help Needed   Getting from bed to chair No Help Needed   Getting dressed No Help Needed   Bathing or showering No Help Needed   Walk across the room (includes cane/walker) No Help Needed   Using the telphone No Help Needed   Taking your medications No Help Needed   Preparing meals No Help Needed   Managing money (expenses/bills) No Help Needed   Moderately strenuous housework (laundry) No Help Needed   Shopping for personal items (toiletries/medicines) No Help Needed   Shopping for groceries No Help Needed   Driving No Help Needed   Climbing a flight of stairs No Help Needed   Getting to places beyond walking

## 2024-11-09 LAB
ALBUMIN SERPL-MCNC: 3.8 G/DL (ref 3.5–5)
ALBUMIN/GLOB SERPL: 1.1 (ref 1.1–2.2)
ALP SERPL-CCNC: 66 U/L (ref 45–117)
ALT SERPL-CCNC: 16 U/L (ref 12–78)
ANION GAP SERPL CALC-SCNC: 6 MMOL/L (ref 2–12)
AST SERPL-CCNC: 12 U/L (ref 15–37)
BILIRUB SERPL-MCNC: 0.4 MG/DL (ref 0.2–1)
BUN SERPL-MCNC: 10 MG/DL (ref 6–20)
BUN/CREAT SERPL: 10 (ref 12–20)
CALCIUM SERPL-MCNC: 9.5 MG/DL (ref 8.5–10.1)
CHLORIDE SERPL-SCNC: 104 MMOL/L (ref 97–108)
CHOLEST SERPL-MCNC: 244 MG/DL
CO2 SERPL-SCNC: 30 MMOL/L (ref 21–32)
CREAT SERPL-MCNC: 1.01 MG/DL (ref 0.55–1.02)
CREAT UR-MCNC: 118 MG/DL
CRP SERPL-MCNC: <0.29 MG/DL (ref 0–0.3)
ERYTHROCYTE [SEDIMENTATION RATE] IN BLOOD: 27 MM/HR (ref 0–30)
EST. AVERAGE GLUCOSE BLD GHB EST-MCNC: 324 MG/DL
GLOBULIN SER CALC-MCNC: 3.4 G/DL (ref 2–4)
GLUCOSE SERPL-MCNC: 252 MG/DL (ref 65–100)
HBA1C MFR BLD: 12.9 % (ref 4–5.6)
HDLC SERPL-MCNC: 70 MG/DL
HDLC SERPL: 3.5 (ref 0–5)
LDLC SERPL CALC-MCNC: 144 MG/DL (ref 0–100)
MICROALBUMIN UR-MCNC: 50 MG/DL
MICROALBUMIN/CREAT UR-RTO: 424 MG/G (ref 0–30)
POTASSIUM SERPL-SCNC: 3.7 MMOL/L (ref 3.5–5.1)
PROT SERPL-MCNC: 7.2 G/DL (ref 6.4–8.2)
SODIUM SERPL-SCNC: 140 MMOL/L (ref 136–145)
TRIGL SERPL-MCNC: 150 MG/DL
VLDLC SERPL CALC-MCNC: 30 MG/DL

## 2024-11-11 LAB
T4 FREE SERPL-MCNC: 0.45 NG/DL (ref 0.82–1.77)
TSH SERPL DL<=0.05 MIU/L-ACNC: 95.5 UIU/ML (ref 0.45–4.5)

## 2024-11-12 NOTE — RESULT ENCOUNTER NOTE
Labs show poor control of most of your medical conditions. Good news is you are back and we can get this fixed up. We'll want to recheck these in 3mo and I anticipate nice improvement.

## 2024-12-17 ENCOUNTER — OFFICE VISIT (OUTPATIENT)
Age: 69
End: 2024-12-17
Payer: MEDICARE

## 2024-12-17 VITALS
HEIGHT: 64 IN | SYSTOLIC BLOOD PRESSURE: 149 MMHG | HEART RATE: 80 BPM | WEIGHT: 228.6 LBS | BODY MASS INDEX: 39.03 KG/M2 | TEMPERATURE: 97.4 F | RESPIRATION RATE: 18 BRPM | OXYGEN SATURATION: 97 % | DIASTOLIC BLOOD PRESSURE: 94 MMHG

## 2024-12-17 DIAGNOSIS — E11.65 TYPE 2 DIABETES MELLITUS WITH HYPERGLYCEMIA, WITHOUT LONG-TERM CURRENT USE OF INSULIN (HCC): Primary | ICD-10-CM

## 2024-12-17 DIAGNOSIS — Z23 ENCOUNTER FOR IMMUNIZATION: ICD-10-CM

## 2024-12-17 DIAGNOSIS — E78.2 MIXED HYPERLIPIDEMIA: ICD-10-CM

## 2024-12-17 DIAGNOSIS — E66.01 SEVERE OBESITY (BMI 35.0-39.9) WITH COMORBIDITY: ICD-10-CM

## 2024-12-17 DIAGNOSIS — I10 ESSENTIAL HYPERTENSION WITH GOAL BLOOD PRESSURE LESS THAN 130/85: ICD-10-CM

## 2024-12-17 DIAGNOSIS — R60.0 PEDAL EDEMA: ICD-10-CM

## 2024-12-17 PROCEDURE — 1159F MED LIST DOCD IN RCRD: CPT | Performed by: FAMILY MEDICINE

## 2024-12-17 PROCEDURE — 3046F HEMOGLOBIN A1C LEVEL >9.0%: CPT | Performed by: FAMILY MEDICINE

## 2024-12-17 PROCEDURE — 3017F COLORECTAL CA SCREEN DOC REV: CPT | Performed by: FAMILY MEDICINE

## 2024-12-17 PROCEDURE — 1123F ACP DISCUSS/DSCN MKR DOCD: CPT | Performed by: FAMILY MEDICINE

## 2024-12-17 PROCEDURE — 1036F TOBACCO NON-USER: CPT | Performed by: FAMILY MEDICINE

## 2024-12-17 PROCEDURE — 3080F DIAST BP >= 90 MM HG: CPT | Performed by: FAMILY MEDICINE

## 2024-12-17 PROCEDURE — 3077F SYST BP >= 140 MM HG: CPT | Performed by: FAMILY MEDICINE

## 2024-12-17 PROCEDURE — G8417 CALC BMI ABV UP PARAM F/U: HCPCS | Performed by: FAMILY MEDICINE

## 2024-12-17 PROCEDURE — G8400 PT W/DXA NO RESULTS DOC: HCPCS | Performed by: FAMILY MEDICINE

## 2024-12-17 PROCEDURE — 1090F PRES/ABSN URINE INCON ASSESS: CPT | Performed by: FAMILY MEDICINE

## 2024-12-17 PROCEDURE — G8427 DOCREV CUR MEDS BY ELIG CLIN: HCPCS | Performed by: FAMILY MEDICINE

## 2024-12-17 PROCEDURE — 1126F AMNT PAIN NOTED NONE PRSNT: CPT | Performed by: FAMILY MEDICINE

## 2024-12-17 PROCEDURE — 2022F DILAT RTA XM EVC RTNOPTHY: CPT | Performed by: FAMILY MEDICINE

## 2024-12-17 PROCEDURE — 99214 OFFICE O/P EST MOD 30 MIN: CPT | Performed by: FAMILY MEDICINE

## 2024-12-17 PROCEDURE — G8484 FLU IMMUNIZE NO ADMIN: HCPCS | Performed by: FAMILY MEDICINE

## 2024-12-17 RX ORDER — TIRZEPATIDE 5 MG/.5ML
5 INJECTION, SOLUTION SUBCUTANEOUS WEEKLY
Qty: 6 ML | Refills: 11 | Status: SHIPPED | OUTPATIENT
Start: 2024-01-07

## 2024-12-17 RX ORDER — FUROSEMIDE 40 MG/1
40 TABLET ORAL DAILY PRN
Qty: 90 TABLET | Refills: 0 | Status: SHIPPED | OUTPATIENT
Start: 2024-12-17

## 2024-12-17 ASSESSMENT — PATIENT HEALTH QUESTIONNAIRE - PHQ9
1. LITTLE INTEREST OR PLEASURE IN DOING THINGS: NOT AT ALL
SUM OF ALL RESPONSES TO PHQ QUESTIONS 1-9: 0
SUM OF ALL RESPONSES TO PHQ QUESTIONS 1-9: 0
SUM OF ALL RESPONSES TO PHQ9 QUESTIONS 1 & 2: 0
2. FEELING DOWN, DEPRESSED OR HOPELESS: NOT AT ALL
SUM OF ALL RESPONSES TO PHQ QUESTIONS 1-9: 0
SUM OF ALL RESPONSES TO PHQ QUESTIONS 1-9: 0

## 2024-12-17 NOTE — PROGRESS NOTES
Janell Webber is a 69 y.o. female  Chief Complaint   Patient presents with    Discuss Medications    Hypertension     HPI:  DM2  Not doing well on last A1c, but had \"fallen off the wagon\" and wasn't taking her meds much at all last year. Back on meds, and we were able to start mounjaro 2.5's in 11/2024. Johnna well. Remains on invokamet /1000mg 1 pill daily.    Lab Results   Component Value Date    LABA1C 12.9 (H) 11/08/2024    LABA1C 9.5 (H) 05/17/2023    LABA1C 12.4 (H) 01/18/2023    GLUCOSE 252 (H) 11/08/2024    CREATININE 1.01 11/08/2024     Hypertension.   Blood pressures a little above goal lately. Management at last visit included continuing current regimen, but has been off meds lately. Current regimen: angiotensin II receptor antagonist and loop diuretic. Symptoms include no sx, Patient denies chest pain, palpitations.  Lab review:  Lab Results   Component Value Date     11/08/2024    K 3.7 11/08/2024     11/08/2024    CO2 30 11/08/2024    GLUCOSE 252 (H) 11/08/2024    BUN 10 11/08/2024    CREATININE 1.01 11/08/2024     Hypothyroid  Lab Results   Component Value Date    TSH 95.500 (H) 11/08/2024   Not in goal off meds, now back on synthroid to 200mcg/day. Johnna well.    Hyperlipidemia.  Back on on lipitor 40, has plenty, taking regularly now.. Was a little up when off that fall 2024. Johnna well. No myalgias, arthralgias, unusual weakness.  Lab Results   Component Value Date    CHOL 244 (H) 11/08/2024    HDL 70 11/08/2024     (H) 11/08/2024    TRIG 150 (H) 11/08/2024    AST 12 (L) 11/08/2024    ALT 16 11/08/2024    ALKPHOS 66 11/08/2024    BILITOT 0.4 11/08/2024       Reviewed PMH, PSH, SH, Medications, allergies (see chart).  Current Outpatient Medications   Medication Sig    atorvastatin (LIPITOR) 40 MG tablet Take 1 tablet by mouth daily    Canagliflozin-metFORMIN HCl ER (INVOKAMET XR) 150-1000 MG TB24 Take 2 capsules by mouth daily Indications: sugar, kidney, and pressure    losartan

## 2024-12-17 NOTE — PROGRESS NOTES
Janell Webber is a 69 y.o. female presenting for/with:    Chief Complaint   Patient presents with    Discuss Medications    Hypertension       Vitals:    12/17/24 1158   BP: (!) 149/94   Site: Left Upper Arm   Position: Sitting   Cuff Size: Large Adult   Pulse: 80   Resp: 18   Temp: 97.4 °F (36.3 °C)   TempSrc: Temporal   SpO2: 97%   Weight: 103.7 kg (228 lb 9.6 oz)   Height: 1.626 m (5' 4\")       Pain Scale: 0 - No pain/10  Pain Location:     \"Have you been to the ER, urgent care clinic since your last visit?  Hospitalized since your last visit?\"    NO    “Have you seen or consulted any other health care providers outside of [facility] since your last visit?”    NO       Have you had a mammogram?”   NO    Date of last Mammogram: 3/28/2023               12/17/2024    11:55 AM   PHQ-9    Little interest or pleasure in doing things 0   Feeling down, depressed, or hopeless 0   PHQ-2 Score 0   PHQ-9 Total Score 0           12/17/2024    11:50 AM 12/14/2022    12:00 AM   [facility] AMB LEARNING ASSESSMENT   Primary Learner Patient Patient   Primary Language ENGLISH ENGLISH   Learning Preference DEMONSTRATION READING   Answered By patient patient   Relationship to Learner SELF SELF            12/17/2024    11:56 AM   Amb Fall Risk Assessment and TUG Test   Do you feel unsteady or are you worried about falling?  no   2 or more falls in past year? no   Fall with injury in past year? no           12/17/2024    11:00 AM 11/8/2024     1:00 PM   ADL ASSESSMENT   Feeding yourself No Help Needed No Help Needed   Getting from bed to chair No Help Needed No Help Needed   Getting dressed No Help Needed No Help Needed   Bathing or showering No Help Needed No Help Needed   Walk across the room (includes cane/walker) No Help Needed No Help Needed   Using the telphone No Help Needed No Help Needed   Taking your medications No Help Needed No Help Needed   Preparing meals No Help Needed No Help Needed   Managing money

## 2025-02-10 DIAGNOSIS — R60.0 PEDAL EDEMA: ICD-10-CM

## 2025-02-11 RX ORDER — FUROSEMIDE 40 MG/1
TABLET ORAL
Qty: 90 TABLET | Refills: 3 | Status: SHIPPED | OUTPATIENT
Start: 2025-02-11

## 2025-03-10 RX ORDER — MELOXICAM 7.5 MG/1
TABLET ORAL
Qty: 90 TABLET | Refills: 3 | Status: SHIPPED | OUTPATIENT
Start: 2025-03-10

## 2025-03-10 RX ORDER — OMEPRAZOLE 40 MG/1
40 CAPSULE, DELAYED RELEASE ORAL DAILY
Qty: 90 CAPSULE | Refills: 3 | Status: SHIPPED | OUTPATIENT
Start: 2025-03-10

## (undated) DEVICE — FORCEPS BX L240CM JAW DIA2.8MM L CAP W/ NDL MIC MESH TOOTH

## (undated) DEVICE — TUBING HYDR IRR --